# Patient Record
Sex: FEMALE | Race: BLACK OR AFRICAN AMERICAN | Employment: UNEMPLOYED | URBAN - METROPOLITAN AREA
[De-identification: names, ages, dates, MRNs, and addresses within clinical notes are randomized per-mention and may not be internally consistent; named-entity substitution may affect disease eponyms.]

---

## 2017-02-08 ENCOUNTER — APPOINTMENT (OUTPATIENT)
Dept: GENERAL RADIOLOGY | Age: 47
End: 2017-02-08
Attending: EMERGENCY MEDICINE
Payer: COMMERCIAL

## 2017-02-08 ENCOUNTER — HOSPITAL ENCOUNTER (EMERGENCY)
Age: 47
Discharge: HOME | End: 2017-02-08
Attending: EMERGENCY MEDICINE
Payer: COMMERCIAL

## 2017-02-08 VITALS
SYSTOLIC BLOOD PRESSURE: 182 MMHG | WEIGHT: 223 LBS | TEMPERATURE: 98.8 F | DIASTOLIC BLOOD PRESSURE: 92 MMHG | OXYGEN SATURATION: 96 % | RESPIRATION RATE: 16 BRPM | HEIGHT: 69 IN | BODY MASS INDEX: 33.03 KG/M2 | HEART RATE: 84 BPM
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
## 2017-02-08 DIAGNOSIS — T14.8XXA MUSCLE STRAIN: ICD-10-CM

## 2017-02-08 PROCEDURE — 73030 X-RAY EXAM OF SHOULDER: CPT

## 2017-02-08 PROCEDURE — 72040 X-RAY EXAM NECK SPINE 2-3 VW: CPT

## 2017-02-08 PROCEDURE — 99283 EMERGENCY DEPT VISIT LOW MDM: CPT | Performed by: PHYSICIAN ASSISTANT

## 2017-02-08 RX ORDER — IBUPROFEN 800 MG/1
800 TABLET ORAL
Qty: 30 TAB | Refills: 0 | Status: SHIPPED | OUTPATIENT
Start: 2017-02-08 | End: 2017-02-18

## 2017-02-08 RX ORDER — METHOCARBAMOL 750 MG/1
750 TABLET, FILM COATED ORAL 3 TIMES DAILY
Qty: 21 TAB | Refills: 0 | Status: SHIPPED | OUTPATIENT
Start: 2017-02-08 | End: 2017-02-15

## 2017-02-08 NOTE — ED NOTES
I have reviewed discharge instructions with the patient. The patient verbalized understanding. Prescriptions x 2 given to pt. Patient denies any further needs, questions, or concerns at this time. No adverse reactions to any treatments, meds, or procedures noted. Pt signed hard copy d/c form.
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
## 2017-10-14 ENCOUNTER — HOSPITAL ENCOUNTER (EMERGENCY)
Age: 47
Discharge: HOME | End: 2017-10-14
Attending: EMERGENCY MEDICINE
Payer: MEDICARE

## 2017-10-14 VITALS
WEIGHT: 220 LBS | HEIGHT: 69 IN | TEMPERATURE: 97.8 F | SYSTOLIC BLOOD PRESSURE: 175 MMHG | HEART RATE: 92 BPM | RESPIRATION RATE: 16 BRPM | OXYGEN SATURATION: 99 % | BODY MASS INDEX: 32.58 KG/M2 | DIASTOLIC BLOOD PRESSURE: 92 MMHG

## 2017-10-14 DIAGNOSIS — T14.8XXA BITE WOUND: Primary | ICD-10-CM

## 2017-10-14 PROCEDURE — 74011250636 HC RX REV CODE- 250/636: Performed by: EMERGENCY MEDICINE

## 2017-10-14 PROCEDURE — 74011250637 HC RX REV CODE- 250/637: Performed by: EMERGENCY MEDICINE

## 2017-10-14 PROCEDURE — 90471 IMMUNIZATION ADMIN: CPT | Performed by: EMERGENCY MEDICINE

## 2017-10-14 PROCEDURE — 90715 TDAP VACCINE 7 YRS/> IM: CPT | Performed by: EMERGENCY MEDICINE

## 2017-10-14 PROCEDURE — 99283 EMERGENCY DEPT VISIT LOW MDM: CPT | Performed by: EMERGENCY MEDICINE

## 2017-10-14 RX ORDER — DOXYCYCLINE 100 MG/1
100 CAPSULE ORAL
Status: COMPLETED | OUTPATIENT
Start: 2017-10-14 | End: 2017-10-14

## 2017-10-14 RX ORDER — DOXYCYCLINE HYCLATE 100 MG
100 TABLET ORAL 2 TIMES DAILY
Qty: 14 TAB | Refills: 0 | Status: SHIPPED | OUTPATIENT
Start: 2017-10-14 | End: 2017-10-21

## 2017-10-14 RX ADMIN — DOXYCYCLINE HYCLATE 100 MG: 100 CAPSULE ORAL at 01:35

## 2017-10-14 RX ADMIN — TETANUS TOXOID, REDUCED DIPHTHERIA TOXOID AND ACELLULAR PERTUSSIS VACCINE, ADSORBED 0.5 ML: 5; 2.5; 8; 8; 2.5 SUSPENSION INTRAMUSCULAR at 01:36

## 2017-10-14 NOTE — DISCHARGE INSTRUCTIONS
Gentle soap and cleaning to bite wound,  Then neosporin and bandiad  Antibiotics for three days to prevent infection    Alternate 4 ibuprofen every 8 hours with 2 extra-strength tylenol every 6 hours  Return if worse
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
## 2018-01-13 ENCOUNTER — HOSPITAL ENCOUNTER (EMERGENCY)
Age: 48
Discharge: HOME | End: 2018-01-14
Attending: EMERGENCY MEDICINE
Payer: MEDICARE

## 2018-01-13 DIAGNOSIS — N39.0 URINARY TRACT INFECTION WITHOUT HEMATURIA, SITE UNSPECIFIED: ICD-10-CM

## 2018-01-13 DIAGNOSIS — I10 HYPERTENSION, UNSPECIFIED TYPE: Primary | ICD-10-CM

## 2018-01-13 LAB
ALBUMIN SERPL-MCNC: 3.9 G/DL (ref 3.5–5)
ALBUMIN/GLOB SERPL: 0.8 {RATIO} (ref 1.2–3.5)
ALP SERPL-CCNC: 100 U/L (ref 50–136)
ALT SERPL-CCNC: 31 U/L (ref 12–65)
ANION GAP SERPL CALC-SCNC: 10 MMOL/L (ref 7–16)
AST SERPL-CCNC: 28 U/L (ref 15–37)
BASOPHILS # BLD: 0.1 K/UL (ref 0–0.2)
BASOPHILS NFR BLD: 0 % (ref 0–2)
BILIRUB SERPL-MCNC: 0.2 MG/DL (ref 0.2–1.1)
BUN SERPL-MCNC: 5 MG/DL (ref 6–23)
CALCIUM SERPL-MCNC: 9.6 MG/DL (ref 8.3–10.4)
CHLORIDE SERPL-SCNC: 106 MMOL/L (ref 98–107)
CO2 SERPL-SCNC: 23 MMOL/L (ref 21–32)
CREAT SERPL-MCNC: 0.85 MG/DL (ref 0.6–1)
DIFFERENTIAL METHOD BLD: ABNORMAL
EOSINOPHIL # BLD: 0.2 K/UL (ref 0–0.8)
EOSINOPHIL NFR BLD: 2 % (ref 0.5–7.8)
ERYTHROCYTE [DISTWIDTH] IN BLOOD BY AUTOMATED COUNT: 16.2 % (ref 11.9–14.6)
GLOBULIN SER CALC-MCNC: 4.9 G/DL (ref 2.3–3.5)
GLUCOSE SERPL-MCNC: 107 MG/DL (ref 65–100)
HCT VFR BLD AUTO: 42.3 % (ref 35.8–46.3)
HGB BLD-MCNC: 13.9 G/DL (ref 11.7–15.4)
IMM GRANULOCYTES # BLD: 0.1 K/UL (ref 0–0.5)
IMM GRANULOCYTES NFR BLD AUTO: 1 % (ref 0–5)
LYMPHOCYTES # BLD: 6.9 K/UL (ref 0.5–4.6)
LYMPHOCYTES NFR BLD: 50 % (ref 13–44)
MCH RBC QN AUTO: 25.1 PG (ref 26.1–32.9)
MCHC RBC AUTO-ENTMCNC: 32.9 G/DL (ref 31.4–35)
MCV RBC AUTO: 76.4 FL (ref 79.6–97.8)
MONOCYTES # BLD: 0.8 K/UL (ref 0.1–1.3)
MONOCYTES NFR BLD: 6 % (ref 4–12)
NEUTS SEG # BLD: 5.5 K/UL (ref 1.7–8.2)
NEUTS SEG NFR BLD: 41 % (ref 43–78)
PLATELET # BLD AUTO: 441 K/UL (ref 150–450)
PMV BLD AUTO: 10.1 FL (ref 10.8–14.1)
POTASSIUM SERPL-SCNC: 3.5 MMOL/L (ref 3.5–5.1)
PROT SERPL-MCNC: 8.8 G/DL (ref 6.3–8.2)
RBC # BLD AUTO: 5.54 M/UL (ref 4.05–5.25)
SODIUM SERPL-SCNC: 139 MMOL/L (ref 136–145)
WBC # BLD AUTO: 13.6 K/UL (ref 4.3–11.1)

## 2018-01-13 PROCEDURE — 99285 EMERGENCY DEPT VISIT HI MDM: CPT | Performed by: EMERGENCY MEDICINE

## 2018-01-13 PROCEDURE — 80053 COMPREHEN METABOLIC PANEL: CPT | Performed by: EMERGENCY MEDICINE

## 2018-01-13 PROCEDURE — 85025 COMPLETE CBC W/AUTO DIFF WBC: CPT | Performed by: EMERGENCY MEDICINE

## 2018-01-13 RX ORDER — LISINOPRIL 10 MG/1
10 TABLET ORAL DAILY
COMMUNITY
End: 2018-01-14

## 2018-01-13 RX ORDER — HYDROXYZINE PAMOATE 25 MG/1
CAPSULE ORAL
Refills: 5 | COMMUNITY
Start: 2018-01-02

## 2018-01-13 RX ORDER — FLUVOXAMINE MALEATE 100 MG/1
TABLET, COATED ORAL
Refills: 5 | COMMUNITY
Start: 2018-01-05

## 2018-01-14 VITALS
OXYGEN SATURATION: 97 % | HEART RATE: 67 BPM | HEIGHT: 69 IN | RESPIRATION RATE: 23 BRPM | BODY MASS INDEX: 32.58 KG/M2 | SYSTOLIC BLOOD PRESSURE: 177 MMHG | WEIGHT: 220 LBS | TEMPERATURE: 98.1 F | DIASTOLIC BLOOD PRESSURE: 89 MMHG

## 2018-01-14 LAB
ATRIAL RATE: 63 BPM
BACTERIA URNS QL MICRO: NORMAL /HPF
CALCULATED P AXIS, ECG09: 73 DEGREES
CALCULATED R AXIS, ECG10: 57 DEGREES
CALCULATED T AXIS, ECG11: 36 DEGREES
CASTS URNS QL MICRO: 0 /LPF
CRYSTALS URNS QL MICRO: 0 /LPF
DIAGNOSIS, 93000: NORMAL
EPI CELLS #/AREA URNS HPF: 0 /HPF
HCG UR QL: NEGATIVE
MUCOUS THREADS URNS QL MICRO: 0 /LPF
OTHER OBSERVATIONS,UCOM: NORMAL
P-R INTERVAL, ECG05: 160 MS
Q-T INTERVAL, ECG07: 410 MS
QRS DURATION, ECG06: 90 MS
QTC CALCULATION (BEZET), ECG08: 419 MS
RBC #/AREA URNS HPF: 0 /HPF
TROPONIN I BLD-MCNC: 0 NG/ML (ref 0.02–0.05)
VENTRICULAR RATE, ECG03: 63 BPM
WBC URNS QL MICRO: NORMAL /HPF

## 2018-01-14 PROCEDURE — 81015 MICROSCOPIC EXAM OF URINE: CPT | Performed by: EMERGENCY MEDICINE

## 2018-01-14 PROCEDURE — 74011250637 HC RX REV CODE- 250/637: Performed by: EMERGENCY MEDICINE

## 2018-01-14 PROCEDURE — 93005 ELECTROCARDIOGRAM TRACING: CPT | Performed by: EMERGENCY MEDICINE

## 2018-01-14 PROCEDURE — 81025 URINE PREGNANCY TEST: CPT

## 2018-01-14 PROCEDURE — 84484 ASSAY OF TROPONIN QUANT: CPT

## 2018-01-14 RX ORDER — CLONIDINE HYDROCHLORIDE 0.1 MG/1
0.1 TABLET ORAL
Status: COMPLETED | OUTPATIENT
Start: 2018-01-14 | End: 2018-01-14

## 2018-01-14 RX ORDER — SULFAMETHOXAZOLE AND TRIMETHOPRIM 800; 160 MG/1; MG/1
1 TABLET ORAL 2 TIMES DAILY
Qty: 10 TAB | Refills: 0 | Status: SHIPPED | OUTPATIENT
Start: 2018-01-14 | End: 2018-01-19

## 2018-01-14 RX ORDER — LISINOPRIL AND HYDROCHLOROTHIAZIDE 12.5; 2 MG/1; MG/1
1 TABLET ORAL DAILY
Qty: 30 TAB | Refills: 0 | Status: SHIPPED | OUTPATIENT
Start: 2018-01-14

## 2018-01-14 RX ORDER — NAPROXEN 250 MG/1
500 TABLET ORAL ONCE
Status: COMPLETED | OUTPATIENT
Start: 2018-01-14 | End: 2018-01-14

## 2018-01-14 RX ORDER — METAXALONE 800 MG/1
800 TABLET ORAL ONCE
Status: COMPLETED | OUTPATIENT
Start: 2018-01-14 | End: 2018-01-14

## 2018-01-14 RX ADMIN — CLONIDINE HYDROCHLORIDE 0.1 MG: 0.1 TABLET ORAL at 00:12

## 2018-01-14 RX ADMIN — METAXALONE 800 MG: 800 TABLET ORAL at 00:58

## 2018-01-14 RX ADMIN — NAPROXEN 500 MG: 250 TABLET ORAL at 00:58

## 2018-01-14 RX ADMIN — CLONIDINE HYDROCHLORIDE 0.1 MG: 0.1 TABLET ORAL at 00:58

## 2018-01-14 NOTE — ED TRIAGE NOTES
Pt complains of hypertension and posterior headache.  States she has taken prescription lisinopril and ibuprofin at 2100

## 2018-01-14 NOTE — DISCHARGE INSTRUCTIONS
High Blood Pressure: Care Instructions  Your Care Instructions    If your blood pressure is usually above 140/90, you have high blood pressure, or hypertension. That means the top number is 140 or higher or the bottom number is 90 or higher, or both. Despite what a lot of people think, high blood pressure usually doesn't cause headaches or make you feel dizzy or lightheaded. It usually has no symptoms. But it does increase your risk for heart attack, stroke, and kidney or eye damage. The higher your blood pressure, the more your risk increases. Your doctor will give you a goal for your blood pressure. Your goal will be based on your health and your age. An example of a goal is to keep your blood pressure below 140/90. Lifestyle changes, such as eating healthy and being active, are always important to help lower blood pressure. You might also take medicine to reach your blood pressure goal.  Follow-up care is a key part of your treatment and safety. Be sure to make and go to all appointments, and call your doctor if you are having problems. It's also a good idea to know your test results and keep a list of the medicines you take. How can you care for yourself at home? Medical treatment  · If you stop taking your medicine, your blood pressure will go back up. You may take one or more types of medicine to lower your blood pressure. Be safe with medicines. Take your medicine exactly as prescribed. Call your doctor if you think you are having a problem with your medicine. · Talk to your doctor before you start taking aspirin every day. Aspirin can help certain people lower their risk of a heart attack or stroke. But taking aspirin isn't right for everyone, because it can cause serious bleeding. · See your doctor regularly. You may need to see the doctor more often at first or until your blood pressure comes down.   · If you are taking blood pressure medicine, talk to your doctor before you take decongestants or anti-inflammatory medicine, such as ibuprofen. Some of these medicines can raise blood pressure. · Learn how to check your blood pressure at home. Lifestyle changes  · Stay at a healthy weight. This is especially important if you put on weight around the waist. Losing even 10 pounds can help you lower your blood pressure. · If your doctor recommends it, get more exercise. Walking is a good choice. Bit by bit, increase the amount you walk every day. Try for at least 30 minutes on most days of the week. You also may want to swim, bike, or do other activities. · Avoid or limit alcohol. Talk to your doctor about whether you can drink any alcohol. · Try to limit how much sodium you eat to less than 2,300 milligrams (mg) a day. Your doctor may ask you to try to eat less than 1,500 mg a day. · Eat plenty of fruits (such as bananas and oranges), vegetables, legumes, whole grains, and low-fat dairy products. · Lower the amount of saturated fat in your diet. Saturated fat is found in animal products such as milk, cheese, and meat. Limiting these foods may help you lose weight and also lower your risk for heart disease. · Do not smoke. Smoking increases your risk for heart attack and stroke. If you need help quitting, talk to your doctor about stop-smoking programs and medicines. These can increase your chances of quitting for good. When should you call for help? Call 911 anytime you think you may need emergency care. This may mean having symptoms that suggest that your blood pressure is causing a serious heart or blood vessel problem. Your blood pressure may be over 180/110. ? For example, call 911 if:  ? · You have symptoms of a heart attack. These may include:  ¨ Chest pain or pressure, or a strange feeling in the chest.  ¨ Sweating. ¨ Shortness of breath. ¨ Nausea or vomiting.   ¨ Pain, pressure, or a strange feeling in the back, neck, jaw, or upper belly or in one or both shoulders or arms.  ¨ Lightheadedness or sudden weakness. ¨ A fast or irregular heartbeat. ? · You have symptoms of a stroke. These may include:  ¨ Sudden numbness, tingling, weakness, or loss of movement in your face, arm, or leg, especially on only one side of your body. ¨ Sudden vision changes. ¨ Sudden trouble speaking. ¨ Sudden confusion or trouble understanding simple statements. ¨ Sudden problems with walking or balance. ¨ A sudden, severe headache that is different from past headaches. ? · You have severe back or belly pain. ?Do not wait until your blood pressure comes down on its own. Get help right away. ?Call your doctor now or seek immediate care if:  ? · Your blood pressure is much higher than normal (such as 180/110 or higher), but you don't have symptoms. ? · You think high blood pressure is causing symptoms, such as:  ¨ Severe headache. ¨ Blurry vision. ? Watch closely for changes in your health, and be sure to contact your doctor if:  ? · Your blood pressure measures 140/90 or higher at least 2 times. That means the top number is 140 or higher or the bottom number is 90 or higher, or both. ? · You think you may be having side effects from your blood pressure medicine. ? · Your blood pressure is usually normal, but it goes above normal at least 2 times. Where can you learn more? Go to http://rocael-jane.info/. Enter T267 in the search box to learn more about \"High Blood Pressure: Care Instructions. \"  Current as of: September 21, 2016  Content Version: 11.4  © 2012-5082 [employer]. Care instructions adapted under license by [email or web address] (which disclaims liability or warranty for this information). If you have questions about a medical condition or this instruction, always ask your healthcare professional. [place] [address] any warranty or liability for your use of this information.          Urinary Tract Infection in Women: Care Instructions  Your Care Instructions    A urinary tract infection, or UTI, is a general term for an infection anywhere between the kidneys and the urethra (where urine comes out). Most UTIs are bladder infections. They often cause pain or burning when you urinate. UTIs are caused by bacteria and can be cured with antibiotics. Be sure to complete your treatment so that the infection goes away. Follow-up care is a key part of your treatment and safety. Be sure to make and go to all appointments, and call your doctor if you are having problems. It's also a good idea to know your test results and keep a list of the medicines you take. How can you care for yourself at home? · Take your antibiotics as directed. Do not stop taking them just because you feel better. You need to take the full course of antibiotics. · Drink extra water and other fluids for the next day or two. This may help wash out the bacteria that are causing the infection. (If you have kidney, heart, or liver disease and have to limit fluids, talk with your doctor before you increase your fluid intake.)  · Avoid drinks that are carbonated or have caffeine. They can irritate the bladder. · Urinate often. Try to empty your bladder each time. · To relieve pain, take a hot bath or lay a heating pad set on low over your lower belly or genital area. Never go to sleep with a heating pad in place. To prevent UTIs  · Drink plenty of water each day. This helps you urinate often, which clears bacteria from your system. (If you have kidney, heart, or liver disease and have to limit fluids, talk with your doctor before you increase your fluid intake.)  · Urinate when you need to. · Urinate right after you have sex. · Change sanitary pads often. · Avoid douches, bubble baths, feminine hygiene sprays, and other feminine hygiene products that have deodorants. · After going to the bathroom, wipe from front to back. When should you call for help?   Call your doctor now or seek immediate medical care if:  ? · Symptoms such as fever, chills, nausea, or vomiting get worse or appear for the first time. ? · You have new pain in your back just below your rib cage. This is called flank pain. ? · There is new blood or pus in your urine. ? · You have any problems with your antibiotic medicine. ? Watch closely for changes in your health, and be sure to contact your doctor if:  ? · You are not getting better after taking an antibiotic for 2 days. ? · Your symptoms go away but then come back. Where can you learn more? Go to http://rocael-jane.info/. Enter B793 in the search box to learn more about \"Urinary Tract Infection in Women: Care Instructions. \"  Current as of: May 12, 2017  Content Version: 11.4  © 5941-4807 [employer]. Care instructions adapted under license by [employer] (which disclaims liability or warranty for this information). If you have questions about a medical condition or this instruction, always ask your healthcare professional. [place] [address] any warranty or liability for your use of this information.

## 2018-01-14 NOTE — ED NOTES
I have reviewed discharge instructions with the patient. The patient verbalized understanding. Patient left ED via Discharge Method: ambulatory to Home with family. Opportunity for questions and clarification provided. Patient given 2 scripts. To continue your aftercare when you leave the hospital, you may receive an automated call from our care team to check in on how you are doing. This is a free service and part of our promise to provide the best care and service to meet your aftercare needs.  If you have questions, or wish to unsubscribe from this service please call [phone]. Thank you for Choosing our [facility].

## 2018-01-15 ENCOUNTER — HOSPITAL ENCOUNTER (EMERGENCY)
Age: 48
Discharge: HOME | End: 2018-01-16
Attending: EMERGENCY MEDICINE
Payer: MEDICARE

## 2018-01-15 VITALS
WEIGHT: 220 LBS | HEART RATE: 71 BPM | RESPIRATION RATE: 18 BRPM | SYSTOLIC BLOOD PRESSURE: 132 MMHG | OXYGEN SATURATION: 99 % | HEIGHT: 69 IN | TEMPERATURE: 97.8 F | BODY MASS INDEX: 32.58 KG/M2 | DIASTOLIC BLOOD PRESSURE: 86 MMHG

## 2018-01-15 DIAGNOSIS — I10 HYPERTENSION, UNSPECIFIED TYPE: Primary | ICD-10-CM

## 2018-01-15 LAB — TROPONIN I BLD-MCNC: 0 NG/ML (ref 0.02–0.05)

## 2018-01-15 PROCEDURE — 96372 THER/PROPH/DIAG INJ SC/IM: CPT | Performed by: EMERGENCY MEDICINE

## 2018-01-15 PROCEDURE — 99284 EMERGENCY DEPT VISIT MOD MDM: CPT | Performed by: EMERGENCY MEDICINE

## 2018-01-15 PROCEDURE — 74011250637 HC RX REV CODE- 250/637: Performed by: EMERGENCY MEDICINE

## 2018-01-15 PROCEDURE — 84484 ASSAY OF TROPONIN QUANT: CPT

## 2018-01-15 PROCEDURE — 93005 ELECTROCARDIOGRAM TRACING: CPT | Performed by: EMERGENCY MEDICINE

## 2018-01-15 PROCEDURE — 74011250636 HC RX REV CODE- 250/636: Performed by: EMERGENCY MEDICINE

## 2018-01-15 RX ORDER — CLONIDINE HYDROCHLORIDE 0.1 MG/1
0.2 TABLET ORAL
Status: COMPLETED | OUTPATIENT
Start: 2018-01-15 | End: 2018-01-15

## 2018-01-15 RX ORDER — KETOROLAC TROMETHAMINE 30 MG/ML
60 INJECTION, SOLUTION INTRAMUSCULAR; INTRAVENOUS
Status: COMPLETED | OUTPATIENT
Start: 2018-01-15 | End: 2018-01-15

## 2018-01-15 RX ADMIN — KETOROLAC TROMETHAMINE 60 MG: 30 INJECTION, SOLUTION INTRAMUSCULAR at 20:32

## 2018-01-15 RX ADMIN — CLONIDINE HYDROCHLORIDE 0.2 MG: 0.1 TABLET ORAL at 17:29

## 2018-01-15 NOTE — ED TRIAGE NOTES
Pt states she is having the same neck pain and elevated blood pressure that she was when she was seen here two days ago. Pt was given scripts but states they are not helping.

## 2018-01-16 LAB
ATRIAL RATE: 63 BPM
CALCULATED P AXIS, ECG09: 71 DEGREES
CALCULATED R AXIS, ECG10: 52 DEGREES
CALCULATED T AXIS, ECG11: 41 DEGREES
DIAGNOSIS, 93000: NORMAL
P-R INTERVAL, ECG05: 152 MS
Q-T INTERVAL, ECG07: 398 MS
QRS DURATION, ECG06: 94 MS
QTC CALCULATION (BEZET), ECG08: 407 MS
VENTRICULAR RATE, ECG03: 63 BPM

## 2018-01-16 NOTE — DISCHARGE INSTRUCTIONS
High Blood Pressure: Care Instructions  Your Care Instructions    If your blood pressure is usually above 140/90, you have high blood pressure, or hypertension. That means the top number is 140 or higher or the bottom number is 90 or higher, or both. Despite what a lot of people think, high blood pressure usually doesn't cause headaches or make you feel dizzy or lightheaded. It usually has no symptoms. But it does increase your risk for heart attack, stroke, and kidney or eye damage. The higher your blood pressure, the more your risk increases. Your doctor will give you a goal for your blood pressure. Your goal will be based on your health and your age. An example of a goal is to keep your blood pressure below 140/90. Lifestyle changes, such as eating healthy and being active, are always important to help lower blood pressure. You might also take medicine to reach your blood pressure goal.  Follow-up care is a key part of your treatment and safety. Be sure to make and go to all appointments, and call your doctor if you are having problems. It's also a good idea to know your test results and keep a list of the medicines you take. How can you care for yourself at home? Medical treatment  · If you stop taking your medicine, your blood pressure will go back up. You may take one or more types of medicine to lower your blood pressure. Be safe with medicines. Take your medicine exactly as prescribed. Call your doctor if you think you are having a problem with your medicine. · Talk to your doctor before you start taking aspirin every day. Aspirin can help certain people lower their risk of a heart attack or stroke. But taking aspirin isn't right for everyone, because it can cause serious bleeding. · See your doctor regularly. You may need to see the doctor more often at first or until your blood pressure comes down.   · If you are taking blood pressure medicine, talk to your doctor before you take decongestants or anti-inflammatory medicine, such as ibuprofen. Some of these medicines can raise blood pressure. · Learn how to check your blood pressure at home. Lifestyle changes  · Stay at a healthy weight. This is especially important if you put on weight around the waist. Losing even 10 pounds can help you lower your blood pressure. · If your doctor recommends it, get more exercise. Walking is a good choice. Bit by bit, increase the amount you walk every day. Try for at least 30 minutes on most days of the week. You also may want to swim, bike, or do other activities. · Avoid or limit alcohol. Talk to your doctor about whether you can drink any alcohol. · Try to limit how much sodium you eat to less than 2,300 milligrams (mg) a day. Your doctor may ask you to try to eat less than 1,500 mg a day. · Eat plenty of fruits (such as bananas and oranges), vegetables, legumes, whole grains, and low-fat dairy products. · Lower the amount of saturated fat in your diet. Saturated fat is found in animal products such as milk, cheese, and meat. Limiting these foods may help you lose weight and also lower your risk for heart disease. · Do not smoke. Smoking increases your risk for heart attack and stroke. If you need help quitting, talk to your doctor about stop-smoking programs and medicines. These can increase your chances of quitting for good. When should you call for help? Call 911 anytime you think you may need emergency care. This may mean having symptoms that suggest that your blood pressure is causing a serious heart or blood vessel problem. Your blood pressure may be over 180/110. ? For example, call 911 if:  ? · You have symptoms of a heart attack. These may include:  ¨ Chest pain or pressure, or a strange feeling in the chest.  ¨ Sweating. ¨ Shortness of breath. ¨ Nausea or vomiting.   ¨ Pain, pressure, or a strange feeling in the back, neck, jaw, or upper belly or in one or both shoulders or arms.  ¨ Lightheadedness or sudden weakness. ¨ A fast or irregular heartbeat. ? · You have symptoms of a stroke. These may include:  ¨ Sudden numbness, tingling, weakness, or loss of movement in your face, arm, or leg, especially on only one side of your body. ¨ Sudden vision changes. ¨ Sudden trouble speaking. ¨ Sudden confusion or trouble understanding simple statements. ¨ Sudden problems with walking or balance. ¨ A sudden, severe headache that is different from past headaches. ? · You have severe back or belly pain. ?Do not wait until your blood pressure comes down on its own. Get help right away. ?Call your doctor now or seek immediate care if:  ? · Your blood pressure is much higher than normal (such as 180/110 or higher), but you don't have symptoms. ? · You think high blood pressure is causing symptoms, such as:  ¨ Severe headache. ¨ Blurry vision. ? Watch closely for changes in your health, and be sure to contact your doctor if:  ? · Your blood pressure measures 140/90 or higher at least 2 times. That means the top number is 140 or higher or the bottom number is 90 or higher, or both. ? · You think you may be having side effects from your blood pressure medicine. ? · Your blood pressure is usually normal, but it goes above normal at least 2 times. Where can you learn more? Go to http://rocael-jane.info/. Enter H215 in the search box to learn more about \"High Blood Pressure: Care Instructions. \"  Current as of: September 21, 2016  Content Version: 11.4  © 8437-7067 [employer]. Care instructions adapted under license by [employer] (which disclaims liability or warranty for this information). If you have questions about a medical condition or this instruction, always ask your healthcare professional. [place] [address] any warranty or liability for your use of this information.

## 2018-01-16 NOTE — ED PROVIDER NOTES
HPI Comments: Patient with a history of hypertension and takes lisinopril HCTZ. Recently had her dose increased on Saturday. Still having elevated blood pressures. Presents today with a mild headache  And mild chest pain prior to arrival.   Currently no chest pain but still with occipital headache. Dull in nature. No blurry vision. Patient is a 50 y.o. female presenting with hypertension. The history is provided by the patient. No  was used. Hypertension    This is a new problem. The current episode started more than 2 days ago. The problem has not changed since onset. Associated symptoms include chest pain and headaches. Pertinent negatives include no palpitations, no anxiety, no confusion, no malaise/fatigue, no blurred vision, no neck pain, no peripheral edema, no dizziness, no shortness of breath, no nausea and no vomiting. Risk factors include hypertension. Past Medical History:   Diagnosis Date    bipolar     bipolar    Hypertension        Past Surgical History:   Procedure Laterality Date    HX GYN      BTL         No family history on file. Social History     Social History    Marital status: SINGLE     Spouse name: N/A    Number of children: N/A    Years of education: N/A     Occupational History    Not on file. Social History Main Topics    Smoking status: Current Every Day Smoker     Packs/day: 0.75    Smokeless tobacco: Not on file    Alcohol use 1.5 oz/week     3 Cans of beer per week    Drug use: No    Sexual activity: Not on file     Other Topics Concern    Not on file     Social History Narrative         ALLERGIES: Pcn [penicillins]    Review of Systems   Constitutional: Negative for chills, fever and malaise/fatigue. HENT: Negative for rhinorrhea and sore throat. Eyes: Negative for blurred vision, pain and redness. Respiratory: Negative for chest tightness, shortness of breath and wheezing. Cardiovascular: Positive for chest pain. Negative for palpitations and leg swelling. Gastrointestinal: Negative for abdominal pain, diarrhea, nausea and vomiting. Genitourinary: Negative for dysuria and hematuria. Musculoskeletal: Negative for back pain, gait problem, neck pain and neck stiffness. Skin: Negative for color change and rash. Neurological: Positive for headaches. Negative for dizziness, weakness and numbness. Psychiatric/Behavioral: Negative for confusion. Vitals:    01/15/18 1710 01/15/18 1845 01/15/18 1848   BP: (!) 211/93  134/76   Pulse: 90     Resp: 18     Temp: 97.8 °F (36.6 °C)     SpO2: 99% 99%    Weight: 99.8 kg (220 lb)     Height: 5' 9\" (1.753 m)              Physical Exam   Constitutional: She is oriented to person, place, and time. She appears well-developed and well-nourished. HENT:   Head: Normocephalic and atraumatic. Eyes: EOM are normal. Pupils are equal, round, and reactive to light. Neck: Normal range of motion. Neck supple. Cardiovascular: Normal rate and regular rhythm. No murmur heard. Pulmonary/Chest: Effort normal and breath sounds normal. She has no wheezes. Abdominal: Soft. Bowel sounds are normal. There is no tenderness. Musculoskeletal: Normal range of motion. She exhibits no edema. Neurological: She is alert and oriented to person, place, and time. No cranial nerve deficit. She exhibits normal muscle tone. Coordination normal.   Skin: Skin is warm and dry. Nursing note and vitals reviewed. MDM  Number of Diagnoses or Management Options  Diagnosis management comments: Blood pressure improved. No EKG changes and negative troponin. Will discharge patient with follow-up with primary care doctor for blood pressure management.        Amount and/or Complexity of Data Reviewed  Clinical lab tests: ordered and reviewed  Tests in the medicine section of CPT®: ordered and reviewed    Patient Progress  Patient progress: stable    ED Course       Procedures      EKG: normal sinus rhythm, nonspecific ST and T waves changes. Rate 63. Results Include:    Recent Results (from the past 24 hour(s))   EKG, 12 LEAD, INITIAL    Collection Time: 01/15/18  7:57 PM   Result Value Ref Range    Ventricular Rate 63 BPM    Atrial Rate 63 BPM    P-R Interval 152 ms    QRS Duration 94 ms    Q-T Interval 398 ms    QTC Calculation (Bezet) 407 ms    Calculated P Axis 71 degrees    Calculated R Axis 52 degrees    Calculated T Axis 41 degrees    Diagnosis       !! AGE AND GENDER SPECIFIC ECG ANALYSIS !!   Normal sinus rhythm  Normal ECG  When compared with ECG of 14-JAN-2018 00:15,  No significant change was found     POC TROPONIN-I    Collection Time: 01/15/18  8:55 PM   Result Value Ref Range    Troponin-I (POC) 0 (L) 0.02 - 0.05 ng/ml

## 2018-09-05 ENCOUNTER — HOSPITAL ENCOUNTER (EMERGENCY)
Age: 48
Discharge: HOME | End: 2018-09-05
Attending: EMERGENCY MEDICINE
Payer: MEDICARE

## 2018-09-05 ENCOUNTER — APPOINTMENT (OUTPATIENT)
Dept: GENERAL RADIOLOGY | Age: 48
End: 2018-09-05
Attending: EMERGENCY MEDICINE
Payer: MEDICARE

## 2018-09-05 VITALS
DIASTOLIC BLOOD PRESSURE: 90 MMHG | HEART RATE: 64 BPM | RESPIRATION RATE: 18 BRPM | BODY MASS INDEX: 31.1 KG/M2 | TEMPERATURE: 98.1 F | HEIGHT: 69 IN | SYSTOLIC BLOOD PRESSURE: 168 MMHG | OXYGEN SATURATION: 100 % | WEIGHT: 210 LBS

## 2018-09-05 DIAGNOSIS — R06.2 WHEEZING: Primary | ICD-10-CM

## 2018-09-05 LAB
ALBUMIN SERPL-MCNC: 3.6 G/DL (ref 3.5–5)
ALBUMIN/GLOB SERPL: 0.8 {RATIO} (ref 1.2–3.5)
ALP SERPL-CCNC: 78 U/L (ref 50–136)
ALT SERPL-CCNC: 25 U/L (ref 12–65)
ANION GAP SERPL CALC-SCNC: 10 MMOL/L (ref 7–16)
AST SERPL-CCNC: 26 U/L (ref 15–37)
ATRIAL RATE: 65 BPM
BASOPHILS # BLD: 0.1 K/UL (ref 0–0.2)
BASOPHILS NFR BLD: 1 % (ref 0–2)
BILIRUB SERPL-MCNC: 0.6 MG/DL (ref 0.2–1.1)
BUN SERPL-MCNC: 4 MG/DL (ref 6–23)
CALCIUM SERPL-MCNC: 9.6 MG/DL (ref 8.3–10.4)
CALCULATED P AXIS, ECG09: 35 DEGREES
CALCULATED R AXIS, ECG10: 40 DEGREES
CALCULATED T AXIS, ECG11: 51 DEGREES
CHLORIDE SERPL-SCNC: 104 MMOL/L (ref 98–107)
CO2 SERPL-SCNC: 23 MMOL/L (ref 21–32)
CREAT SERPL-MCNC: 0.63 MG/DL (ref 0.6–1)
DIAGNOSIS, 93000: NORMAL
DIFFERENTIAL METHOD BLD: ABNORMAL
EOSINOPHIL # BLD: 0.2 K/UL (ref 0–0.8)
EOSINOPHIL NFR BLD: 2 % (ref 0.5–7.8)
ERYTHROCYTE [DISTWIDTH] IN BLOOD BY AUTOMATED COUNT: 14.6 %
GLOBULIN SER CALC-MCNC: 4.5 G/DL (ref 2.3–3.5)
GLUCOSE SERPL-MCNC: 101 MG/DL (ref 65–100)
HCT VFR BLD AUTO: 39.2 % (ref 35.8–46.3)
HGB BLD-MCNC: 12.6 G/DL (ref 11.7–15.4)
IMM GRANULOCYTES # BLD: 0 K/UL (ref 0–0.5)
IMM GRANULOCYTES NFR BLD AUTO: 0 % (ref 0–5)
LYMPHOCYTES # BLD: 4.3 K/UL (ref 0.5–4.6)
LYMPHOCYTES NFR BLD: 43 % (ref 13–44)
MCH RBC QN AUTO: 24.1 PG (ref 26.1–32.9)
MCHC RBC AUTO-ENTMCNC: 32.1 G/DL (ref 31.4–35)
MCV RBC AUTO: 75 FL (ref 79.6–97.8)
MONOCYTES # BLD: 0.6 K/UL (ref 0.1–1.3)
MONOCYTES NFR BLD: 6 % (ref 4–12)
NEUTS SEG # BLD: 4.9 K/UL (ref 1.7–8.2)
NEUTS SEG NFR BLD: 48 % (ref 43–78)
NRBC # BLD: 0 K/UL (ref 0–0.2)
P-R INTERVAL, ECG05: 132 MS
PLATELET # BLD AUTO: 422 K/UL (ref 150–450)
PMV BLD AUTO: 10 FL (ref 9.4–12.3)
POTASSIUM SERPL-SCNC: 3.5 MMOL/L (ref 3.5–5.1)
PROT SERPL-MCNC: 8.1 G/DL (ref 6.3–8.2)
Q-T INTERVAL, ECG07: 436 MS
QRS DURATION, ECG06: 84 MS
QTC CALCULATION (BEZET), ECG08: 453 MS
RBC # BLD AUTO: 5.23 M/UL (ref 4.05–5.2)
SODIUM SERPL-SCNC: 137 MMOL/L (ref 136–145)
TROPONIN I SERPL-MCNC: <0.02 NG/ML (ref 0.02–0.05)
VENTRICULAR RATE, ECG03: 65 BPM
WBC # BLD AUTO: 10.2 K/UL (ref 4.3–11.1)

## 2018-09-05 PROCEDURE — 99284 EMERGENCY DEPT VISIT MOD MDM: CPT | Performed by: EMERGENCY MEDICINE

## 2018-09-05 PROCEDURE — 80053 COMPREHEN METABOLIC PANEL: CPT

## 2018-09-05 PROCEDURE — 93005 ELECTROCARDIOGRAM TRACING: CPT | Performed by: EMERGENCY MEDICINE

## 2018-09-05 PROCEDURE — 85025 COMPLETE CBC W/AUTO DIFF WBC: CPT

## 2018-09-05 PROCEDURE — 81003 URINALYSIS AUTO W/O SCOPE: CPT | Performed by: EMERGENCY MEDICINE

## 2018-09-05 PROCEDURE — 74011000250 HC RX REV CODE- 250: Performed by: EMERGENCY MEDICINE

## 2018-09-05 PROCEDURE — 84484 ASSAY OF TROPONIN QUANT: CPT

## 2018-09-05 PROCEDURE — 71045 X-RAY EXAM CHEST 1 VIEW: CPT

## 2018-09-05 PROCEDURE — 94640 AIRWAY INHALATION TREATMENT: CPT

## 2018-09-05 RX ORDER — PREDNISONE 20 MG/1
40 TABLET ORAL DAILY
Qty: 8 TAB | Refills: 0 | Status: SHIPPED | OUTPATIENT
Start: 2018-09-05 | End: 2018-09-09

## 2018-09-05 RX ORDER — IPRATROPIUM BROMIDE AND ALBUTEROL SULFATE 2.5; .5 MG/3ML; MG/3ML
3 SOLUTION RESPIRATORY (INHALATION)
Status: COMPLETED | OUTPATIENT
Start: 2018-09-05 | End: 2018-09-05

## 2018-09-05 RX ORDER — ALBUTEROL SULFATE 90 UG/1
2 AEROSOL, METERED RESPIRATORY (INHALATION)
Qty: 1 INHALER | Refills: 4 | Status: SHIPPED | OUTPATIENT
Start: 2018-09-05

## 2018-09-05 RX ADMIN — IPRATROPIUM BROMIDE AND ALBUTEROL SULFATE 3 ML: .5; 3 SOLUTION RESPIRATORY (INHALATION) at 07:21

## 2018-09-05 NOTE — ED NOTES
I have reviewed discharge instructions with the patient. The patient and spouse verbalized understanding. Patient left ED via Discharge Method: ambulatory to Home with self. Opportunity for questions and clarification provided. Patient given 1 scripts. To continue your aftercare when you leave the hospital, you may receive an automated call from our care team to check in on how you are doing. This is a free service and part of our promise to provide the best care and service to meet your aftercare needs.  If you have questions, or wish to unsubscribe from this service please call [phone]. Thank you for Choosing our [facility] Emergency Department.

## 2018-09-05 NOTE — DISCHARGE INSTRUCTIONS
Wheezing or Bronchoconstriction: Care Instructions  Your Care Instructions  Wheezing is a whistling noise made during breathing. It occurs when the small airways, or bronchial tubes, that lead to your lungs swell or contract (spasm) and become narrow. This narrowing is called bronchoconstriction. When your airways constrict, it is hard for air to pass through and this makes it hard for you to breathe. Wheezing and bronchoconstriction can be caused by many problems, including:  · An infection such as the flu or a cold. · Allergies such as hay fever. · Diseases such as asthma or chronic obstructive pulmonary disease. · Smoking. Treatment for your wheezing depends on what is causing the problem. Your wheezing may get better without treatment. But you may need to pay attention to things that cause your wheezing and avoid them. Or you may need medicine to help treat the wheezing and to reduce the swelling or to relieve spasms in your lungs. Follow-up care is a key part of your treatment and safety. Be sure to make and go to all appointments, and call your doctor if you are having problems. It is also a good idea to know your test results and keep a list of the medicines you take. How can you care for yourself at home? · Take your medicine exactly as prescribed. Call your doctor if you think you are having a problem with your medicine. You will get more details on the specific medicine your doctor prescribes. · If your doctor prescribed antibiotics, take them as directed. Do not stop taking them just because you feel better. You need to take the full course of antibiotics. · Breathe moist air from a humidifier, hot shower, or sink filled with hot water. This may help ease your symptoms and make it easier for you to breathe. · If you have congestion in your nose and throat, drinking plenty of fluids, especially hot fluids, may help relieve your symptoms.  If you have kidney, heart, or liver disease and have to limit fluids, talk with your doctor before you increase the amount of fluids you drink. · If you have mucus in your airways, it may help to breathe deeply and cough. · Do not smoke or allow others to smoke around you. Smoking can make your wheezing worse. If you need help quitting, talk to your doctor about stop-smoking programs and medicines. These can increase your chances of quitting for good. · Avoid things that may cause your wheezing. These may include colds, smoke, air pollution, dust, pollen, pets, cockroaches, stress, and cold air. When should you call for help? Call 911 anytime you think you may need emergency care. For example, call if:    · You have severe trouble breathing.     · You passed out (lost consciousness).    Call your doctor now or seek immediate medical care if:    · You cough up yellow, dark brown, or bloody mucus (sputum).     · You have new or worse shortness of breath.     · Your wheezing is not getting better or it gets worse after you start taking your medicine.    Watch closely for changes in your health, and be sure to contact your doctor if:    · You do not get better as expected. Where can you learn more? Go to http://rocael-jane.info/. Enter 454 4772 in the search box to learn more about \"Wheezing or Bronchoconstriction: Care Instructions. \"  Current as of: December 6, 2017  Content Version: 11.7  © 9351-1495 [employer]. Care instructions adapted under license by "[employer] (which disclaims liability or warranty for this information). If you have questions about a medical condition or this instruction, always ask your healthcare professional. [place] [address] any warranty or liability for your use of this information.   Recent Results (from the past 8 hour(s))   EKG, 12 LEAD, INITIAL    Collection Time: 09/05/18  4:27 AM   Result Value Ref Range    Ventricular Rate 65 BPM    Atrial Rate 65 BPM    P-R Interval 132 ms    QRS Duration 84 ms    Q-T Interval 436 ms    QTC Calculation (Bezet) 453 ms    Calculated P Axis 35 degrees    Calculated R Axis 40 degrees    Calculated T Axis 51 degrees    Diagnosis       Normal sinus rhythm  Normal ECG  When compared with ECG of 15-MIKE-2018 19:57,  Nonspecific T wave abnormality no longer evident in Inferior leads  T wave amplitude has increased in Anterolateral leads  Confirmed by ST PIPPA DUVALL MD (), JODI SEQUEIRA ([id number]) on 9/5/2018 6:04:62 AM     METABOLIC PANEL, COMPREHENSIVE    Collection Time: 09/05/18  4:36 AM   Result Value Ref Range    Sodium 137 136 - 145 mmol/L    Potassium 3.5 3.5 - 5.1 mmol/L    Chloride 104 98 - 107 mmol/L    CO2 23 21 - 32 mmol/L    Anion gap 10 7 - 16 mmol/L    Glucose 101 (H) 65 - 100 mg/dL    BUN 4 (L) 6 - 23 MG/DL    Creatinine 0.63 0.6 - 1.0 MG/DL    GFR est AA >60 >60 ml/min/1.73m2    GFR est non-AA >60 >60 ml/min/1.73m2    Calcium 9.6 8.3 - 10.4 MG/DL    Bilirubin, total 0.6 0.2 - 1.1 MG/DL    ALT (SGPT) 25 12 - 65 U/L    AST (SGOT) 26 15 - 37 U/L    Alk. phosphatase 78 50 - 136 U/L    Protein, total 8.1 6.3 - 8.2 g/dL    Albumin 3.6 3.5 - 5.0 g/dL    Globulin 4.5 (H) 2.3 - 3.5 g/dL    A-G Ratio 0.8 (L) 1.2 - 3.5     CBC WITH AUTOMATED DIFF    Collection Time: 09/05/18  4:36 AM   Result Value Ref Range    WBC 10.2 4.3 - 11.1 K/uL    RBC 5.23 (H) 4.05 - 5.2 M/uL    HGB 12.6 11.7 - 15.4 g/dL    HCT 39.2 35.8 - 46.3 %    MCV 75.0 (L) 79.6 - 97.8 FL    MCH 24.1 (L) 26.1 - 32.9 PG    MCHC 32.1 31.4 - 35.0 g/dL    RDW 14.6 %    PLATELET 827 116 - 452 K/uL    MPV 10.0 9.4 - 12.3 FL    ABSOLUTE NRBC 0.00 0.0 - 0.2 K/uL    DF AUTOMATED      NEUTROPHILS 48 43 - 78 %    LYMPHOCYTES 43 13 - 44 %    MONOCYTES 6 4.0 - 12.0 %    EOSINOPHILS 2 0.5 - 7.8 %    BASOPHILS 1 0.0 - 2.0 %    IMMATURE GRANULOCYTES 0 0.0 - 5.0 %    ABS. NEUTROPHILS 4.9 1.7 - 8.2 K/UL    ABS. LYMPHOCYTES 4.3 0.5 - 4.6 K/UL    ABS. MONOCYTES 0.6 0.1 - 1.3 K/UL    ABS.  EOSINOPHILS 0.2 0.0 - 0.8 K/UL    ABS. BASOPHILS 0.1 0.0 - 0.2 K/UL    ABS. IMM. GRANS. 0.0 0.0 - 0.5 K/UL   TROPONIN I    Collection Time: 09/05/18  4:36 AM   Result Value Ref Range    Troponin-I, Qt. <0.02 (L) 0.02 - 0.05 NG/ML     Xr Chest Port    Result Date: 9/5/2018  EXAM:  XR CHEST PORT INDICATION:  shortness of breath COMPARISON:  7/30/2011 FINDINGS: A portable AP radiograph of the chest was obtained at 0433 hours. The patient is on a cardiac monitor. The lungs are clear. The cardiac and mediastinal contours and pulmonary vascularity are normal.  The bones and soft tissues are grossly within normal limits.      IMPRESSION: Normal chest.

## 2018-09-05 NOTE — ED PROVIDER NOTES
HPI: HPI Comments: 57-year-old female smoker presents to the emergency department with a few days of worsening shortness of breath. No cough. No fever. No alleviating. Worse with activity. She smokes a pack every day and a half Review of Systems: 
Review of Systems Constitutional: Negative for chills and fever. HENT: Negative. Respiratory: Positive for shortness of breath. Cardiovascular: Negative for chest pain. Musculoskeletal: Negative. Skin: Negative. Psychiatric/Behavioral: Negative. Past Medical History:    
Primary Care Doctor: Moncho Acevedo MD 
 
Past Medical History:  
Diagnosis Date  bipolar   
 bipolar  Hypertension Past Surgical History:  
Procedure Laterality Date  HX GYN    
 BTL Social History Social History  Marital status: SINGLE Spouse name: N/A  
 Number of children: N/A  
 Years of education: N/A Social History Main Topics  Smoking status: Current Every Day Smoker Packs/day: 0.75  Smokeless tobacco: Not on file  Alcohol use 1.5 oz/week 3 Cans of beer per week  Drug use: No  
 Sexual activity: Not on file Other Topics Concern  Not on file Social History Narrative Previous Medications FLUVOXAMINE (LUVOX) 100 MG TABLET    TAKE 3 TABLETS BY MOUTH EVERY MORNING  
 HYDROXYZINE PAMOATE (VISTARIL) 25 MG CAPSULE    TAKE ONE CAPSULE BY MOUTH 3 TIMES A DAY AS NEEDED  
 LISINOPRIL-HYDROCHLOROTHIAZIDE (PRINZIDE, ZESTORETIC) 20-12.5 MG PER TABLET    Take 1 Tab by mouth daily. OLANZAPINE (ZYPREXA PO)    Take 20 mg by mouth nightly. Allergies Allergen Reactions  Pcn [Penicillins] Rash Physical Exam:   
Vitals:  
 09/05/18 3838 09/05/18 3861 09/05/18 0530 09/05/18 7876 BP: (!) 193/96  177/90 Pulse: 61  64 Resp: 24  18 Temp: 97.9 °F (36.6 °C)  98.1 °F (36.7 °C) SpO2: 98% 97% 99% 99% Vital signs were reviewed.  Pulse oximetry interpretation: normal 
 Nursing documentation reviewed. Physical Exam  
Constitutional: She is oriented to person, place, and time. She appears well-developed and well-nourished. HENT:  
Head: Normocephalic. Cardiovascular: Normal rate and regular rhythm. Pulmonary/Chest: She has wheezes. Abdominal: Soft. Neurological: She is alert and oriented to person, place, and time. Skin: Skin is warm and dry. Psychiatric: She has a normal mood and affect. Her behavior is normal.  
Nursing note and vitals reviewed. ____________________________________________________________________ Medical Decision Making: MDM Number of Diagnoses or Management Options Diagnosis management comments: 43-year-old female presents to emergency room with shortness of breath started a few days ago Chest x-ray is negative. Troponin is normal.  She appears well Scattered wheezes Given an inhaler. Feels better afterwards We'll discharge home Amount and/or Complexity of Data Reviewed Clinical lab tests: ordered and reviewed 
 
 
______________________________________________________________________ 
ED Evaluation Labs:  
Recent Results (from the past 12 hour(s)) EKG, 12 LEAD, INITIAL Collection Time: 09/05/18  4:27 AM  
Result Value Ref Range Ventricular Rate 65 BPM  
 Atrial Rate 65 BPM  
 P-R Interval 132 ms QRS Duration 84 ms Q-T Interval 436 ms  
 QTC Calculation (Bezet) 453 ms Calculated P Axis 35 degrees Calculated R Axis 40 degrees Calculated T Axis 51 degrees Diagnosis Normal sinus rhythm Normal ECG When compared with ECG of 15-MIKE-2018 19:57, 
Nonspecific T wave abnormality no longer evident in Inferior leads T wave amplitude has increased in Anterolateral leads Confirmed by ST PIPPA DUVALL MD (), JODI SEQUEIRA ([id number]) on 9/5/2018 8:57:34 AM 
  
METABOLIC PANEL, COMPREHENSIVE Collection Time: 09/05/18  4:36 AM  
Result Value Ref Range  Sodium 137 136 - 145 mmol/L  
 Potassium 3.5 3.5 - 5.1 mmol/L Chloride 104 98 - 107 mmol/L  
 CO2 23 21 - 32 mmol/L Anion gap 10 7 - 16 mmol/L Glucose 101 (H) 65 - 100 mg/dL BUN 4 (L) 6 - 23 MG/DL Creatinine 0.63 0.6 - 1.0 MG/DL  
 GFR est AA >60 >60 ml/min/1.73m2 GFR est non-AA >60 >60 ml/min/1.73m2 Calcium 9.6 8.3 - 10.4 MG/DL Bilirubin, total 0.6 0.2 - 1.1 MG/DL  
 ALT (SGPT) 25 12 - 65 U/L  
 AST (SGOT) 26 15 - 37 U/L Alk. phosphatase 78 50 - 136 U/L Protein, total 8.1 6.3 - 8.2 g/dL Albumin 3.6 3.5 - 5.0 g/dL Globulin 4.5 (H) 2.3 - 3.5 g/dL A-G Ratio 0.8 (L) 1.2 - 3.5    
CBC WITH AUTOMATED DIFF Collection Time: 09/05/18  4:36 AM  
Result Value Ref Range WBC 10.2 4.3 - 11.1 K/uL  
 RBC 5.23 (H) 4.05 - 5.2 M/uL  
 HGB 12.6 11.7 - 15.4 g/dL HCT 39.2 35.8 - 46.3 % MCV 75.0 (L) 79.6 - 97.8 FL  
 MCH 24.1 (L) 26.1 - 32.9 PG  
 MCHC 32.1 31.4 - 35.0 g/dL  
 RDW 14.6 % PLATELET 469 951 - 580 K/uL MPV 10.0 9.4 - 12.3 FL ABSOLUTE NRBC 0.00 0.0 - 0.2 K/uL  
 DF AUTOMATED NEUTROPHILS 48 43 - 78 % LYMPHOCYTES 43 13 - 44 % MONOCYTES 6 4.0 - 12.0 % EOSINOPHILS 2 0.5 - 7.8 % BASOPHILS 1 0.0 - 2.0 % IMMATURE GRANULOCYTES 0 0.0 - 5.0 %  
 ABS. NEUTROPHILS 4.9 1.7 - 8.2 K/UL  
 ABS. LYMPHOCYTES 4.3 0.5 - 4.6 K/UL  
 ABS. MONOCYTES 0.6 0.1 - 1.3 K/UL  
 ABS. EOSINOPHILS 0.2 0.0 - 0.8 K/UL  
 ABS. BASOPHILS 0.1 0.0 - 0.2 K/UL  
 ABS. IMM. GRANS. 0.0 0.0 - 0.5 K/UL  
TROPONIN I Collection Time: 09/05/18  4:36 AM  
Result Value Ref Range Troponin-I, Qt. <0.02 (L) 0.02 - 0.05 NG/ML Labs were reviewed and interpreted by me. Radiology studies performed: XR CHEST PORT Final Result IMPRESSION: Normal chest.  
  
  
  
 Radiographs were visualized by me No current facility-administered medications for this encounter. Current Outpatient Prescriptions Medication Sig  
 lisinopril-hydroCHLOROthiazide (PRINZIDE, ZESTORETIC) 20-12.5 mg per tablet Take 1 Tab by mouth daily.  fluvoxaMINE (LUVOX) 100 mg tablet TAKE 3 TABLETS BY MOUTH EVERY MORNING  hydrOXYzine pamoate (VISTARIL) 25 mg capsule TAKE ONE CAPSULE BY MOUTH 3 TIMES A DAY AS NEEDED  
 OLANZAPINE (ZYPREXA PO) Take 20 mg by mouth nightly.  
 
 
================================================================== 
ASSESSMENT: 49-year-old female with shortness of breath. PLAN: ssteroids  And inhaler discharge 
_____________________________________________________________________ Condition:  improved Disposition:  home Diagnosis:  Shortness of breath likely early COPD Von Avery MD; 9/5/2018 @6:57 AM=========================================== 
 
ED Course

## 2018-09-05 NOTE — ED TRIAGE NOTES
Patient states shortness of breath that has been on going for past couple days. States worse when she lays down. States no history of breathing problems or CHF.

## 2020-02-23 ENCOUNTER — APPOINTMENT (OUTPATIENT)
Dept: CT IMAGING | Age: 50
End: 2020-02-23
Attending: EMERGENCY MEDICINE
Payer: MEDICARE

## 2020-02-23 ENCOUNTER — HOSPITAL ENCOUNTER (EMERGENCY)
Age: 50
Discharge: HOME | End: 2020-02-23
Attending: EMERGENCY MEDICINE
Payer: MEDICARE

## 2020-02-23 VITALS
OXYGEN SATURATION: 96 % | RESPIRATION RATE: 16 BRPM | TEMPERATURE: 97.8 F | BODY MASS INDEX: 31.25 KG/M2 | WEIGHT: 211 LBS | DIASTOLIC BLOOD PRESSURE: 87 MMHG | HEART RATE: 85 BPM | SYSTOLIC BLOOD PRESSURE: 146 MMHG | HEIGHT: 69 IN

## 2020-02-23 DIAGNOSIS — G44.209 TENSION HEADACHE: Primary | ICD-10-CM

## 2020-02-23 DIAGNOSIS — I10 HYPERTENSION, UNSPECIFIED TYPE: ICD-10-CM

## 2020-02-23 LAB
ATRIAL RATE: 78 BPM
CALCULATED P AXIS, ECG09: 64 DEGREES
CALCULATED R AXIS, ECG10: 40 DEGREES
CALCULATED T AXIS, ECG11: 43 DEGREES
DIAGNOSIS, 93000: NORMAL
P-R INTERVAL, ECG05: 156 MS
Q-T INTERVAL, ECG07: 376 MS
QRS DURATION, ECG06: 90 MS
QTC CALCULATION (BEZET), ECG08: 428 MS
VENTRICULAR RATE, ECG03: 78 BPM

## 2020-02-23 PROCEDURE — 70450 CT HEAD/BRAIN W/O DYE: CPT

## 2020-02-23 PROCEDURE — 74011250636 HC RX REV CODE- 250/636: Performed by: EMERGENCY MEDICINE

## 2020-02-23 PROCEDURE — 74011250637 HC RX REV CODE- 250/637: Performed by: EMERGENCY MEDICINE

## 2020-02-23 PROCEDURE — 93005 ELECTROCARDIOGRAM TRACING: CPT | Performed by: EMERGENCY MEDICINE

## 2020-02-23 PROCEDURE — 99284 EMERGENCY DEPT VISIT MOD MDM: CPT

## 2020-02-23 PROCEDURE — 96374 THER/PROPH/DIAG INJ IV PUSH: CPT

## 2020-02-23 RX ORDER — CLONIDINE HYDROCHLORIDE 0.1 MG/1
0.1 TABLET ORAL
Qty: 28 TAB | Refills: 0 | Status: SHIPPED | OUTPATIENT
Start: 2020-02-23 | End: 2020-03-08

## 2020-02-23 RX ORDER — KETOROLAC TROMETHAMINE 30 MG/ML
15 INJECTION, SOLUTION INTRAMUSCULAR; INTRAVENOUS
Status: COMPLETED | OUTPATIENT
Start: 2020-02-23 | End: 2020-02-23

## 2020-02-23 RX ORDER — CLONIDINE HYDROCHLORIDE 0.1 MG/1
0.1 TABLET ORAL
Status: COMPLETED | OUTPATIENT
Start: 2020-02-23 | End: 2020-02-23

## 2020-02-23 RX ADMIN — CLONIDINE HYDROCHLORIDE 0.1 MG: 0.1 TABLET ORAL at 02:30

## 2020-02-23 RX ADMIN — KETOROLAC TROMETHAMINE 15 MG: 30 INJECTION, SOLUTION INTRAMUSCULAR; INTRAVENOUS at 02:30

## 2020-02-23 NOTE — ED NOTES
I have reviewed discharge instructions with the patient. The patient verbalized understanding. Patient left ED via Discharge Method: ambulatory to Home with friend    Opportunity for questions and clarification provided. Patient given 1 scripts. To continue your aftercare when you leave the hospital, you may receive an automated call from our care team to check in on how you are doing. This is a free service and part of our promise to provide the best care and service to meet your aftercare needs.  If you have questions, or wish to unsubscribe from this service please call [phone]. Thank you for Choosing our [facility] Emergency Department.

## 2020-02-23 NOTE — ED PROVIDER NOTES
Patient is a 17-year-old female presenting to the emergency department today complaining of a headache in the back of the neck and across the forehead in a bandlike distribution. The patient denied any visual changes. She said that she went to an urgent care about 630 on 2/22/2020 and was told that her blood pressure was 385 systolic and that she needed to go to the hospital.  The patient says that her blood pressure routinely runs in the high 100s and low 200s. The patient has had her blood pressure medication adjusted 1 year ago but has not had any recent changes. She is currently taking lisinopril 40 mg and hydrochlorothiazide 25 mg. The patient says that she is compliant with all her medications. She denies any focal neurologic deficits but she said she was concerned because she has a family history of strokes. Past Medical History:   Diagnosis Date    bipolar     bipolar    Hypertension        Past Surgical History:   Procedure Laterality Date    HX GYN      BTL         No family history on file. Social History     Socioeconomic History    Marital status: SINGLE     Spouse name: Not on file    Number of children: Not on file    Years of education: Not on file    Highest education level: Not on file   Occupational History    Not on file   Social Needs    Financial resource strain: Not on file    Food insecurity:     Worry: Not on file     Inability: Not on file    Transportation needs:     Medical: Not on file     Non-medical: Not on file   Tobacco Use    Smoking status: Current Every Day Smoker     Packs/day: 0.75   Substance and Sexual Activity    Alcohol use:  Yes     Alcohol/week: 2.5 standard drinks     Types: 3 Cans of beer per week    Drug use: No    Sexual activity: Not on file   Lifestyle    Physical activity:     Days per week: Not on file     Minutes per session: Not on file    Stress: Not on file   Relationships    Social connections:     Talks on phone: Not on file     Gets together: Not on file     Attends Sikh service: Not on file     Active member of club or organization: Not on file     Attends meetings of clubs or organizations: Not on file     Relationship status: Not on file    Intimate partner violence:     Fear of current or ex partner: Not on file     Emotionally abused: Not on file     Physically abused: Not on file     Forced sexual activity: Not on file   Other Topics Concern    Not on file   Social History Narrative    Not on file         ALLERGIES: Pcn [penicillins]    Review of Systems   Constitutional: Negative. HENT: Negative. Eyes: Negative. Respiratory: Negative. Cardiovascular: Negative. Neurological: Positive for headaches. Negative for syncope, speech difficulty, weakness, light-headedness and numbness. Vitals:    02/23/20 0137 02/23/20 0154 02/23/20 0200 02/23/20 0230   BP: (!) 169/100 150/87 165/89 (!) 163/93   Pulse: 88 82 85 82   Resp: 18  20    Temp: 97.8 °F (36.6 °C)      SpO2: 94% 94% 94% 95%   Weight: 95.7 kg (211 lb)      Height: 5' 9\" (1.753 m)               Physical Exam     GENERAL:The patient is obese, and well-hydrated. No acute distress  VITAL SIGNS: Heart rate, blood pressure, respiratory rate reviewed as recorded in  nurse's notes  EYES: Pupils reactive. Extraocular motion intact. No conjunctival redness or drainage. Positive red reflex no papilledema appreciated. NECK: Supple, no meningeal signs. Trachea midline. No masses or thyromegaly. Tender to palpation over the occipital condyles bilaterally with some hypertrophy of the cervical paraspinal muscles. Tenderness palpation over the spinous processes present. Negative Kernig and Brudzinski sign. LUNGS: Breath sounds clear and equal bilaterally no accessory muscle use  CHEST: No deformity  CARDIOVASCULAR: Regular rate and rhythm  ABDOMEN: Soft without tenderness. No palpable masses or organomegaly. No  peritoneal signs. No rigidity.   EXTREMITIES: No clubbing or cyanosis. No joint swelling. Normal muscle tone. No  restricted range of motion appreciated. NEUROLOGIC: Sensation is grossly intact. Cranial nerve exam reveals face is  symmetrical, tongue is midline speech is clear. Negative pronator drift and  strength is 5-5 equal bilaterally. SKIN: No rash or petechiae. Good skin turgor palpated. PSYCHIATRIC: Alert and oriented. Appropriate behavior and judgment. MDM  Number of Diagnoses or Management Options  Diagnosis management comments: TIA, CVA, intracranial hemorrhage, ischemic stroke, brain tumor, Bell's palsy,    tension headache, migraine headache,    meningitis, encephalitis,    seizure, pseudoseizures, status epilepticus, malingering    peripheral neuropathy, metabolic disorder, Guillian Bardolph syndrome, multiple sclerosis,    electrolyte abnormality           Amount and/or Complexity of Data Reviewed  Clinical lab tests: reviewed and ordered  Tests in the radiology section of CPT®: ordered and reviewed  Tests in the medicine section of CPT®: ordered and reviewed  Review and summarize past medical records: yes  Independent visualization of images, tracings, or specimens: yes      ED Course as of Feb 23 0313   Sun Feb 23, 2020 0311 IMPRESSION:     1. No CT evidence of acute intracranial abnormality. CT HEAD WO CONT [KH]   [id number] Patient is doing well. She continues to not have any focal neurologic deficits on repeat examination. I talked to the patient about control of her blood pressure and the need to keep her appointment with her family doctor that she has scheduled in March.     []      ED Course User Index  [] Iris Baker, DO       Procedures

## 2020-02-23 NOTE — ED TRIAGE NOTES
Reports high blood pressure, reports seen at urgent care yesterday and instructed to come to ED however did not. Reports frontal  head pain. States compliant with bp meds. +smoker.  Denies cough, chest pain, n/v/d. +etoh

## 2020-02-23 NOTE — DISCHARGE INSTRUCTIONS
Keep your appointment with the family doctor in March.   Take the Catapres every 12 hours as needed for blood pressure greater than 180 or symptomatic symptoms as we discussed

## 2021-06-08 ENCOUNTER — HOSPITAL ENCOUNTER (EMERGENCY)
Age: 51
Discharge: HOME | End: 2021-06-08
Attending: EMERGENCY MEDICINE
Payer: MEDICARE

## 2021-06-08 VITALS
HEIGHT: 69 IN | SYSTOLIC BLOOD PRESSURE: 156 MMHG | OXYGEN SATURATION: 96 % | WEIGHT: 229 LBS | HEART RATE: 68 BPM | TEMPERATURE: 97.7 F | BODY MASS INDEX: 33.92 KG/M2 | RESPIRATION RATE: 16 BRPM | DIASTOLIC BLOOD PRESSURE: 92 MMHG

## 2021-06-08 DIAGNOSIS — L30.8 HERPES ZOSTER DERMATITIS: ICD-10-CM

## 2021-06-08 DIAGNOSIS — B02.8 HERPES ZOSTER DERMATITIS: ICD-10-CM

## 2021-06-08 DIAGNOSIS — B02.9 HERPES ZOSTER WITHOUT COMPLICATION: Primary | ICD-10-CM

## 2021-06-08 PROCEDURE — 99283 EMERGENCY DEPT VISIT LOW MDM: CPT

## 2021-06-08 RX ORDER — NAPROXEN 500 MG/1
500 TABLET ORAL 2 TIMES DAILY WITH MEALS
Qty: 20 TABLET | Refills: 0 | Status: SHIPPED | OUTPATIENT
Start: 2021-06-08 | End: 2021-06-18

## 2021-06-08 RX ORDER — PREDNISONE 50 MG/1
50 TABLET ORAL DAILY
Qty: 3 TABLET | Refills: 0 | Status: SHIPPED | OUTPATIENT
Start: 2021-06-08 | End: 2021-06-11

## 2021-06-08 RX ORDER — ACYCLOVIR 800 MG/1
800 TABLET ORAL
Qty: 35 TABLET | Refills: 0 | Status: SHIPPED | OUTPATIENT
Start: 2021-06-08 | End: 2021-06-15

## 2021-06-08 NOTE — ED PROVIDER NOTES
79 Brandan Samuels is a 44 y.o. female seen on 6/8/2021 in the [facility] EMERGENCY DEPT in room ER17/17. Chief Complaint   Patient presents with    Shingles     HPI: 78-year-old -American female presented to the emergency department with complaints of painful rash. Patient states that it started Saturday (3 days ago on her back and wraps around to the left side of her stomach. Patient states that it burns and hurts. Patient has no other constitutional symptoms. She is taken no medications. She thinks that she may have had chickenpox as a child. She denies any fevers, chills, nausea, vomiting, diarrhea, changes in bowel or bladder habits or any other concerns. Historian: Patient    REVIEW OF SYSTEMS     Review of Systems   Constitutional: Negative. HENT: Negative. Respiratory: Negative. Cardiovascular: Negative. Gastrointestinal: Negative. Genitourinary: Negative. Musculoskeletal: Negative. Skin: Positive for rash. Psychiatric/Behavioral: Negative. All other systems reviewed and are negative. PAST MEDICAL HISTORY     Past Medical History:   Diagnosis Date    bipolar     bipolar    Hypertension      Past Surgical History:   Procedure Laterality Date    HX GYN      BTL     Social History     Socioeconomic History    Marital status: SINGLE     Spouse name: Not on file    Number of children: Not on file    Years of education: Not on file    Highest education level: Not on file   Tobacco Use    Smoking status: Current Every Day Smoker     Packs/day: 0.75   Substance and Sexual Activity    Alcohol use:  Yes     Alcohol/week: 2.5 standard drinks     Types: 3 Cans of beer per week    Drug use: No     Social Determinants of Health     Financial Resource Strain:     Difficulty of Paying Living Expenses:    Food Insecurity:     Worried About Running Out of Food in the Last Year:     [address] in the Last Year: Transportation Needs:     Lack of Transportation (Medical):  Lack of Transportation (Non-Medical):    Physical Activity:     Days of Exercise per Week:     Minutes of Exercise per Session:    Stress:     Feeling of Stress :    Social Connections:     Frequency of Communication with Friends and Family:     Frequency of Social Gatherings with Friends and Family:     Attends Shinto Services:     Active Member of Clubs or Organizations:     Attends Club or Organization Meetings:     Marital Status:      Prior to Admission Medications   Prescriptions Last Dose Informant Patient Reported? Taking? OLANZAPINE (ZYPREXA PO)   Yes No   Sig: Take 20 mg by mouth nightly. albuterol (VENTOLIN HFA) 90 mcg/actuation inhaler   No No   Sig: Take 2 Puffs by inhalation every four (4) hours as needed for Wheezing or Shortness of Breath. fluvoxaMINE (LUVOX) 100 mg tablet   Yes No   Sig: TAKE 3 TABLETS BY MOUTH EVERY MORNING   hydrOXYzine pamoate (VISTARIL) 25 mg capsule   Yes No   Sig: TAKE ONE CAPSULE BY MOUTH 3 TIMES A DAY AS NEEDED   lisinopril-hydroCHLOROthiazide (PRINZIDE, ZESTORETIC) 20-12.5 mg per tablet   No No   Sig: Take 1 Tab by mouth daily. Facility-Administered Medications: None     Allergies   Allergen Reactions    Pcn [Penicillins] Rash        PHYSICAL EXAM       Vitals:    06/08/21 1330   BP: (!) 169/92   Pulse: 79   Resp: 16   Temp: 97.9 °F (36.6 °C)   SpO2: 95%    Vital signs were reviewed. Physical Exam  Vitals and nursing note reviewed. Constitutional:       General: She is not in acute distress. Appearance: Normal appearance. She is not ill-appearing or toxic-appearing. HENT:      Head: Normocephalic and atraumatic. Mouth/Throat:      Mouth: Mucous membranes are moist.   Cardiovascular:      Rate and Rhythm: Normal rate. Pulmonary:      Effort: Pulmonary effort is normal.   Abdominal:      Palpations: Abdomen is soft. Tenderness: There is no abdominal tenderness. There is no guarding. Skin:     General: Skin is warm and dry. Findings: Rash (Erythematous and vesicular rash located along the left T7/8 dermatome.) present. Neurological:      General: No focal deficit present. Mental Status: She is alert and oriented to person, place, and time. Psychiatric:         Mood and Affect: Mood normal.         Behavior: Behavior normal.         Thought Content: Thought content normal.         Judgment: Judgment normal.          MEDICAL DECISION MAKING     ED Course:    No results found for this or any previous visit (from the past 8 hour(s)). No results found. MDM  Number of Diagnoses or Management Options  Diagnosis management comments: 73-year-old female with a erythematous vesicular rash on the right mid torso wrapping around to the midline on the left side. Rash consistent with zoster dermatitis. Patient will be discharged home with short course of steroids and acyclovir. She is given instructions to avoid being around pregnant and immunocompromised patients. Disposition: Discharged  Diagnosis:     ICD-10-CM ICD-9-CM   1. Herpes zoster without complication  Z05.0 061.5   2. Herpes zoster dermatitis  B02.8 053.9    L30.8      ____________________________________________________________________  A portion of this note was generated using voice recognition dictation software. While the note has been reviewed for accuracy, please note certain words and phrases may not be transcribed as intended and some grammatical and/or typographical errors may be present.

## 2021-06-08 NOTE — ED NOTES
I have reviewed discharge instructions with the patient. The patient verbalized understanding. Patient left ED via Discharge Method: ambulatory to Home with self. Opportunity for questions and clarification provided. Patient given 3 scripts. To continue your aftercare when you leave the hospital, you may receive an automated call from our care team to check in on how you are doing. This is a free service and part of our promise to provide the best care and service to meet your aftercare needs.  If you have questions, or wish to unsubscribe from this service please call [phone]. Thank you for Choosing our [facility] Emergency Department.

## 2021-06-08 NOTE — DISCHARGE INSTRUCTIONS
Avoid pregnant and immunocompromised individuals. Take Tylenol for additional pain control. Return emergency department any concerns.

## 2021-06-08 NOTE — ED TRIAGE NOTES
Pt ambulatory to triage. Pt reports rash to left ribs and abd since Saturday. Denies known hx of shingles. Pt states area burns.

## 2022-07-03 ENCOUNTER — HOSPITAL ENCOUNTER (EMERGENCY)
Dept: CT IMAGING | Age: 52
Discharge: HOME | End: 2022-07-06
Payer: MEDICARE

## 2022-07-03 ENCOUNTER — HOSPITAL ENCOUNTER (EMERGENCY)
Age: 52
Discharge: HOME | End: 2022-07-03
Attending: STUDENT IN AN ORGANIZED HEALTH CARE EDUCATION/TRAINING PROGRAM
Payer: MEDICARE

## 2022-07-03 ENCOUNTER — APPOINTMENT (OUTPATIENT)
Dept: GENERAL RADIOLOGY | Age: 52
End: 2022-07-03
Payer: MEDICARE

## 2022-07-03 VITALS
HEIGHT: 70 IN | OXYGEN SATURATION: 99 % | DIASTOLIC BLOOD PRESSURE: 85 MMHG | BODY MASS INDEX: 32.21 KG/M2 | HEART RATE: 53 BPM | WEIGHT: 225 LBS | SYSTOLIC BLOOD PRESSURE: 128 MMHG | RESPIRATION RATE: 14 BRPM | TEMPERATURE: 97.5 F

## 2022-07-03 DIAGNOSIS — M25.511 ACUTE PAIN OF RIGHT SHOULDER: Primary | ICD-10-CM

## 2022-07-03 LAB
ANION GAP SERPL CALC-SCNC: 6 MMOL/L (ref 7–16)
BASOPHILS # BLD: 0.1 K/UL (ref 0–0.2)
BASOPHILS NFR BLD: 1 % (ref 0–2)
BUN SERPL-MCNC: 7 MG/DL (ref 6–23)
CALCIUM SERPL-MCNC: 9.5 MG/DL (ref 8.3–10.4)
CHLORIDE SERPL-SCNC: 104 MMOL/L (ref 98–107)
CO2 SERPL-SCNC: 28 MMOL/L (ref 21–32)
CREAT SERPL-MCNC: 0.8 MG/DL (ref 0.6–1)
DIFFERENTIAL METHOD BLD: ABNORMAL
EKG ATRIAL RATE: 59 BPM
EKG DIAGNOSIS: NORMAL
EKG P AXIS: 14 DEGREES
EKG P-R INTERVAL: 153 MS
EKG Q-T INTERVAL: 447 MS
EKG QRS DURATION: 92 MS
EKG QTC CALCULATION (BAZETT): 443 MS
EKG R AXIS: 57 DEGREES
EKG T AXIS: 46 DEGREES
EKG VENTRICULAR RATE: 59 BPM
EOSINOPHIL # BLD: 0.3 K/UL (ref 0–0.8)
EOSINOPHIL NFR BLD: 3 % (ref 0.5–7.8)
ERYTHROCYTE [DISTWIDTH] IN BLOOD BY AUTOMATED COUNT: 14.8 % (ref 11.9–14.6)
GLUCOSE SERPL-MCNC: 122 MG/DL (ref 65–100)
HCT VFR BLD AUTO: 41.7 % (ref 35.8–46.3)
HGB BLD-MCNC: 13.1 G/DL (ref 11.7–15.4)
IMM GRANULOCYTES # BLD AUTO: 0.1 K/UL (ref 0–0.5)
IMM GRANULOCYTES NFR BLD AUTO: 1 % (ref 0–5)
INR PPP: 1
LYMPHOCYTES # BLD: 5.3 K/UL (ref 0.5–4.6)
LYMPHOCYTES NFR BLD: 49 % (ref 13–44)
MCH RBC QN AUTO: 22.8 PG (ref 26.1–32.9)
MCHC RBC AUTO-ENTMCNC: 31.4 G/DL (ref 31.4–35)
MCV RBC AUTO: 72.6 FL (ref 79.6–97.8)
MONOCYTES # BLD: 0.7 K/UL (ref 0.1–1.3)
MONOCYTES NFR BLD: 6 % (ref 4–12)
NEUTS SEG # BLD: 4.4 K/UL (ref 1.7–8.2)
NEUTS SEG NFR BLD: 40 % (ref 43–78)
NRBC # BLD: 0 K/UL (ref 0–0.2)
PLATELET # BLD AUTO: 425 K/UL (ref 150–450)
PLATELET COMMENT: ADEQUATE
PMV BLD AUTO: 10.5 FL (ref 9.4–12.3)
POTASSIUM SERPL-SCNC: 3.9 MMOL/L (ref 3.5–5.1)
PROTHROMBIN TIME: 13.2 SEC (ref 12.6–14.5)
RBC # BLD AUTO: 5.74 M/UL (ref 4.05–5.2)
RBC MORPH BLD: ABNORMAL
SODIUM SERPL-SCNC: 138 MMOL/L (ref 136–145)
TROPONIN I SERPL HS-MCNC: 4.8 PG/ML (ref 0–14)
WBC # BLD AUTO: 10.9 K/UL (ref 4.3–11.1)
WBC MORPH BLD: ABNORMAL

## 2022-07-03 PROCEDURE — 84484 ASSAY OF TROPONIN QUANT: CPT

## 2022-07-03 PROCEDURE — 71045 X-RAY EXAM CHEST 1 VIEW: CPT

## 2022-07-03 PROCEDURE — 85610 PROTHROMBIN TIME: CPT

## 2022-07-03 PROCEDURE — 70450 CT HEAD/BRAIN W/O DYE: CPT

## 2022-07-03 PROCEDURE — 99285 EMERGENCY DEPT VISIT HI MDM: CPT

## 2022-07-03 PROCEDURE — 85025 COMPLETE CBC W/AUTO DIFF WBC: CPT

## 2022-07-03 PROCEDURE — 96374 THER/PROPH/DIAG INJ IV PUSH: CPT

## 2022-07-03 PROCEDURE — 93005 ELECTROCARDIOGRAM TRACING: CPT | Performed by: STUDENT IN AN ORGANIZED HEALTH CARE EDUCATION/TRAINING PROGRAM

## 2022-07-03 PROCEDURE — 6360000002 HC RX W HCPCS: Performed by: STUDENT IN AN ORGANIZED HEALTH CARE EDUCATION/TRAINING PROGRAM

## 2022-07-03 PROCEDURE — 80143 DRUG ASSAY ACETAMINOPHEN: CPT

## 2022-07-03 PROCEDURE — 80048 BASIC METABOLIC PNL TOTAL CA: CPT

## 2022-07-03 RX ORDER — SODIUM CHLORIDE 0.9 % (FLUSH) 0.9 %
3 SYRINGE (ML) INJECTION EVERY 8 HOURS
Status: DISCONTINUED | OUTPATIENT
Start: 2022-07-03 | End: 2022-07-03 | Stop reason: HOSPADM

## 2022-07-03 RX ORDER — METHOCARBAMOL 500 MG/1
500 TABLET, FILM COATED ORAL 4 TIMES DAILY
Qty: 20 TABLET | Refills: 0 | Status: SHIPPED | OUTPATIENT
Start: 2022-07-03 | End: 2022-07-08

## 2022-07-03 RX ORDER — KETOROLAC TROMETHAMINE 15 MG/ML
15 INJECTION, SOLUTION INTRAMUSCULAR; INTRAVENOUS ONCE
Status: COMPLETED | OUTPATIENT
Start: 2022-07-03 | End: 2022-07-03

## 2022-07-03 RX ORDER — NAPROXEN 500 MG/1
500 TABLET ORAL 2 TIMES DAILY WITH MEALS
Qty: 10 TABLET | Refills: 0 | Status: SHIPPED | OUTPATIENT
Start: 2022-07-03

## 2022-07-03 RX ADMIN — KETOROLAC TROMETHAMINE 15 MG: 15 INJECTION, SOLUTION INTRAMUSCULAR; INTRAVENOUS at 07:39

## 2022-07-03 ASSESSMENT — ENCOUNTER SYMPTOMS
DIARRHEA: 0
SHORTNESS OF BREATH: 0
NAUSEA: 0
ABDOMINAL PAIN: 0
SORE THROAT: 0
COUGH: 0
PHOTOPHOBIA: 0
VOMITING: 0

## 2022-07-03 ASSESSMENT — PAIN SCALES - GENERAL
PAINLEVEL_OUTOF10: 10
PAINLEVEL_OUTOF10: 10

## 2022-07-03 ASSESSMENT — PAIN - FUNCTIONAL ASSESSMENT: PAIN_FUNCTIONAL_ASSESSMENT: 0-10

## 2022-07-03 ASSESSMENT — PAIN DESCRIPTION - ORIENTATION: ORIENTATION: RIGHT

## 2022-07-03 ASSESSMENT — PAIN DESCRIPTION - LOCATION: LOCATION: ARM

## 2022-07-03 NOTE — ED NOTES
Chief Complaint   Patient presents with    Hypertension    Knee Pain    Follow-up    Constipation       HPI  Tina Oliveira is a 48 y.o. female with multiple medical diagnoses as listed in the medical history and problem list that presents for follow-up for right knee pain that has resolved since she completed PT. She had to miss her last session but is ready to return to work. She has been having some constipation though she has been off of pain medications for one week. She has some bloating also. She has also some dysuria.    PAST MEDICAL HISTORY:  Past Medical History:   Diagnosis Date    Hypertension        PAST SURGICAL HISTORY:  Past Surgical History:   Procedure Laterality Date     SECTION      x 3    TUBAL LIGATION         SOCIAL HISTORY:  Social History     Social History    Marital status:      Spouse name: N/A    Number of children: N/A    Years of education: N/A     Occupational History     [employer]     Social History Main Topics    Smoking status: Former Smoker     Quit date:     Smokeless tobacco: Never Used    Alcohol use Yes      Comment: 4 drinks monthly    Drug use: No    Sexual activity: Yes     Partners: Male     Other Topics Concern    Not on file     Social History Narrative    No narrative on file       FAMILY HISTORY:  Family History   Problem Relation Age of Onset    Hypertension Mother     Arthritis Mother     Cancer Father     Diabetes Sister     Hypertension Sister     Diabetes Sister     Hypertension Sister     Hypertension Sister     Diabetes Sister        ALLERGIES AND MEDICATIONS: updated and reviewed.  Review of patient's allergies indicates:  No Known Allergies  Current Outpatient Prescriptions   Medication Sig Dispense Refill    amLODIPine (NORVASC) 5 MG tablet TAKE ONE TABLET BY MOUTH ONCE DAILY 30 tablet 5    fluticasone (FLONASE) 50 mcg/actuation nasal spray 2 sprays by Each Nare route 2 (two) times daily. 1 Bottle 5     I have reviewed discharge instructions with the patient. The patient verbalized understanding. Patient left ED via Discharge Method: ambulatory to Home with self    Opportunity for questions and clarification provided. Patient given 2 scripts. To continue your aftercare when you leave the hospital, you may receive an automated call from our care team to check in on how you are doing. This is a free service and part of our promise to provide the best care and service to meet your aftercare needs.  If you have questions, or wish to unsubscribe from this service please call [phone]. Thank you for Choosing our [facility] Emergency Department.           Lou Seaman RN  07/03/22 1047 "hydrocodone-acetaminophen 5-325mg (NORCO) 5-325 mg per tablet Take 1 tablet by mouth every 6 (six) hours as needed. 25 tablet 0    tiZANidine (ZANAFLEX) 4 MG tablet Take 1 tablet (4 mg total) by mouth every 8 (eight) hours as needed (Muscle Spasms). 20 tablet 2     No current facility-administered medications for this visit.        ROS  Review of Systems   Constitutional: Negative for chills, diaphoresis, fatigue, fever and unexpected weight change.   HENT: Negative for rhinorrhea, sinus pressure, sore throat and tinnitus.    Eyes: Negative for photophobia and visual disturbance.   Respiratory: Negative for cough, shortness of breath and wheezing.    Cardiovascular: Negative for chest pain and palpitations.   Gastrointestinal: Negative for abdominal pain, blood in stool, constipation, diarrhea, nausea and vomiting.   Genitourinary: Negative for dysuria, flank pain, frequency and vaginal discharge.   Musculoskeletal: Positive for arthralgias. Negative for joint swelling.   Skin: Negative for rash.   Neurological: Negative for speech difficulty, weakness, light-headedness and headaches.   Psychiatric/Behavioral: Negative for behavioral problems and dysphoric mood.       Physical Exam  Vitals:    01/08/18 1346   BP: 136/82   Pulse: 88   Resp: 18   Temp: 98.1 °F (36.7 °C)   TempSrc: Oral   SpO2: 99%   Weight: 98.4 kg (216 lb 14.9 oz)   Height: 5' 7" (1.702 m)    Body mass index is 33.98 kg/m².  Weight: 98.4 kg (216 lb 14.9 oz)   Height: 5' 7" (170.2 cm)     Physical Exam   Constitutional: She is oriented to person, place, and time. She appears well-developed and well-nourished.   Eyes: EOM are normal.   Musculoskeletal:   Left knee-exam WNL, full ROM with flexion, extension, abduction and adduction   Neurological: She is alert and oriented to person, place, and time.   Skin: Skin is warm and dry. No rash noted. No erythema.   Psychiatric: She has a normal mood and affect. Her behavior is normal.   Nursing note and " vitals reviewed.      Health Maintenance       Date Due Completion Date    Sign Pain Contract 05/15/1985 ---    Complete Opioid Risk Tool 05/15/1985 ---    Pap Smear with HPV Cotest 03/29/2015 3/29/2012    Colonoscopy 05/15/2017 ---    Mammogram 04/22/2018 4/22/2016    Lipid Panel 04/22/2021 4/22/2016    TETANUS VACCINE 04/07/2027 4/7/2017 (Declined)    Override on 4/7/2017: Declined            ASSESSMENT     1. Chronic pain of left knee    2. Decreased strength involving knee joint    3. Constipation, unspecified constipation type    4. Dysuria        PLAN:     Problem List Items Addressed This Visit        Orthopedic    Chronic pain of left knee - Primary  -may return to work, letter given clearing her without restrictions  -symptoms resolved      Decreased strength involving knee joint  -resolved      Other Visit Diagnoses     Constipation, unspecified constipation type      -recommend she begin taking daily stool softener with plenty of water    Dysuria      -rule out infection    Relevant Orders    Urine culture            Betsey Gallagher MD  01/08/2018 2:43 PM        Return in about 6 months (around 7/8/2018) for Follow up.

## 2022-07-03 NOTE — ED TRIAGE NOTES
Pt presents with right arm pain and numbness that started approx 9 am Friday morning. States the pain started in her neck and upper shoulder then radiated down her arm. Also states she has taken a significant amount of tylenol over the last 48 hours. Pt states she may have taken as many as 75 tablets of extra strength Tylenol in that time.

## 2022-07-03 NOTE — ED PROVIDER NOTES
[facility] Emergency Department Provider Note                   PCP:                AIRAM Mota               Age: 46 y.o. Sex: female       ICD-10-CM    1. Acute pain of right shoulder  M25.511        DISPOSITION Decision To Discharge 07/03/2022 07:36:43 AM       New Prescriptions    METHOCARBAMOL (ROBAXIN) 500 MG TABLET    Take 1 tablet by mouth 4 times daily for 5 days    NAPROXEN (NAPROSYN) 500 MG TABLET    Take 1 tablet by mouth 2 times daily (with meals)       Orders Placed This Encounter   Procedures    CT HEAD WO CONTRAST    XR CHEST PORTABLE    Basic Metabolic Panel    CBC with Auto Differential    Protime-INR    Troponin    Miscellaneous Sendout    Diet NPO    Cardiac Monitor - ED Only    Continuous Pulse Oximetry    Weigh patient    POCT Glucose    EKG 12 Lead    Saline lock IV        Ledell Bless, DO 9:23 AM      MDM  Number of Diagnoses or Management Options  Acute pain of right shoulder  Diagnosis management comments: 61-year-old female presents to the emergency department with right shoulder and right side of her neck discomfort. Denies any trauma. Began 2 days ago. Initially in triage patient reported numbness, to me as well as to the patient's nurse, denies any numbness or any neurologic complaints. To me only reports pain in the right shoulder and right side of her neck. Has full range of motion of the shoulder and neck without problems. Has normal neurologic function in all extremities. Normal neuro exam.  Favor muscle strain but will rule out ACS. CT of the head which was ordered in triage shows no significant findings. Metabolic panel with normal electrolytes and normal kidney function, normal INR, normal troponin. Patient reports no resolution of symptoms with taking Tylenol, she did drive herself here today. Will give IV Toradol. CBC is normal.  Chest x-ray is normal EKG without signs of ischemia.   Feel patient's symptoms are secondary to musculoskeletal origin, shoulder versus right trapezius muscle strain. She will alternate Tylenol and Motrin at home. Given Robaxin. Sedation precautions given. Given strict return precautions. She voiced understanding and agreement with this plan. EKG shows sinus rhythm, rate 56, , QRS 92, QTc 442, normal axis, no significant ST elevation or depression. Amount and/or Complexity of Data Reviewed  Clinical lab tests: ordered and reviewed  Tests in the radiology section of CPT®: ordered and reviewed  Independent visualization of images, tracings, or specimens: yes    Risk of Complications, Morbidity, and/or Mortality  Presenting problems: moderate  Diagnostic procedures: moderate  Management options: moderate         April Kay Minor is a 46 y.o. female who presents to the Emergency Department with chief complaint of    Chief Complaint   Patient presents with    Numbness      52-year-old female presents to the emergency department initially complaining of numbness. During my exam she denies any numbness. States she has pain in her right shoulder as well as the right clavicular area as well as right side of neck. She reports it began 2 days ago while driving. States she was driving with her right upper extremity, one-handed on the wheel of the vehicle when she began to have pain in the shoulder and right side of neck. Denies numbness or weakness to the right upper extremity. She reports worsening pain with range of motion of her right upper extremity as well as with left side bending of the neck. Denies midline cervical spine tenderness. Denies pain with left and right rotation of the neck. Patient denies any numbness or weakness in any extremity. Denies vision changes, facial numbness, facial droop or weakness. Review of Systems   Constitutional: Negative for chills and fever. HENT: Negative for sore throat. Eyes: Negative for photophobia.    Respiratory: Negative for cough and shortness of breath. Cardiovascular: Negative for chest pain. Gastrointestinal: Negative for abdominal pain, diarrhea, nausea and vomiting. Genitourinary: Negative for dysuria. Musculoskeletal: Positive for arthralgias. Negative for neck pain and neck stiffness. Skin: Negative for rash. Neurological: Negative for syncope and headaches. Psychiatric/Behavioral: Negative for confusion. All other systems reviewed and are negative. Past Medical History:   Diagnosis Date    Hypertension     Psychiatric disorder     bipolar        Past Surgical History:   Procedure Laterality Date    GYN      BTL        No family history on file. Social Connections:     Frequency of Communication with Friends and Family: Not on file    Frequency of Social Gatherings with Friends and Family: Not on file    Attends Christianity Services: Not on file    Active Member of Clubs or Organizations: Not on file    Attends Club or Organization Meetings: Not on file    Marital Status: Not on file        Allergies   Allergen Reactions    Penicillins Rash        Vitals signs and nursing note reviewed. Patient Vitals for the past 4 hrs:   Temp Pulse Resp BP SpO2   07/03/22 0726 -- 60 23 133/88 98 %   07/03/22 0532 97.5 °F (36.4 °C) 65 22 (!) 148/85 94 %          Physical Exam  Vitals and nursing note reviewed. Constitutional:       General: She is not in acute distress. Appearance: Normal appearance. HENT:      Head: Normocephalic. Nose: Nose normal.      Mouth/Throat:      Mouth: Mucous membranes are moist.   Eyes:      Extraocular Movements: Extraocular movements intact. Pupils: Pupils are equal, round, and reactive to light. Cardiovascular:      Rate and Rhythm: Normal rate and regular rhythm. Pulses: Normal pulses. Heart sounds: Normal heart sounds. Pulmonary:      Effort: Pulmonary effort is normal. No respiratory distress. Breath sounds: Normal breath sounds. Abdominal:      General: Abdomen is flat. Palpations: Abdomen is soft. Tenderness: There is no abdominal tenderness. Musculoskeletal:         General: Tenderness present. No swelling, deformity or signs of injury. Normal range of motion. Cervical back: Normal range of motion. No rigidity. Comments: Right upper extremity: Full range of motion of the entire right upper extremity including the shoulder without difficulty. Neurovascularly intact. Tenderness palpation of the right side the neck without any evidence of trauma, erythema or edema. Skin:     General: Skin is warm. Findings: No rash. Neurological:      General: No focal deficit present. Mental Status: She is alert and oriented to person, place, and time. Cranial Nerves: No cranial nerve deficit. Sensory: No sensory deficit. Motor: No weakness. Psychiatric:         Mood and Affect: Mood normal.          Procedures      Labs Reviewed   BASIC METABOLIC PANEL - Abnormal; Notable for the following components:       Result Value    Anion Gap 6 (*)     Glucose 122 (*)     All other components within normal limits   CBC WITH AUTO DIFFERENTIAL - Abnormal; Notable for the following components:    RBC 5.74 (*)     MCV 72.6 (*)     MCH 22.8 (*)     RDW 14.8 (*)     Seg Neutrophils 40 (*)     Lymphocytes 49 (*)     Absolute Lymph # 5.3 (*)     All other components within normal limits   PROTIME-INR   TROPONIN   MISCELLANEOUS SENDOUT   POCT GLUCOSE        XR CHEST PORTABLE   Final Result   No acute cardiopulmonary abnormality. CT HEAD WO CONTRAST   Final Result      1. No CT evidence of acute intracranial process. NIH Stroke Scale  Interval: Baseline  Level of Consciousness (1a): Alert  LOC Questions (1b):  Answers both correctly  LOC Commands (1c): Performs both tasks correctly  Best Gaze (2): Normal  Visual (3): No visual loss  Facial Palsy (4): Normal symmetrical movement  Motor Arm, Left (5a): No drift  Motor Arm, Right (5b): No drift  Motor Leg, Left (6a): No drift  Motor Leg, Right (6b): No drift  Limb Ataxia (7): Absent  Sensory (8): Normal  Best Language (9): No aphasia  Dysarthria (10): Normal  Extinction and Inattention (11): No abnormality  Total: 0                 Voice dictation software was used during the making of this note. This software is not perfect and grammatical and other typographical errors may be present. This note has not been completely proofread for errors.         Stephenie Arriaga DO  07/03/22 8937

## 2022-07-05 LAB
Lab: <1
Lab: NORMAL
REFERENCE LAB: NORMAL

## 2023-05-28 ENCOUNTER — HOSPITAL ENCOUNTER (EMERGENCY)
Age: 53
Discharge: HOME | End: 2023-05-28
Attending: EMERGENCY MEDICINE
Payer: MEDICARE

## 2023-05-28 VITALS
HEART RATE: 91 BPM | SYSTOLIC BLOOD PRESSURE: 162 MMHG | RESPIRATION RATE: 20 BRPM | HEIGHT: 70 IN | TEMPERATURE: 98.7 F | BODY MASS INDEX: 33.21 KG/M2 | DIASTOLIC BLOOD PRESSURE: 101 MMHG | OXYGEN SATURATION: 99 % | WEIGHT: 232 LBS

## 2023-05-28 DIAGNOSIS — B35.4 TINEA CORPORIS: Primary | ICD-10-CM

## 2023-05-28 PROCEDURE — 99283 EMERGENCY DEPT VISIT LOW MDM: CPT

## 2023-05-28 RX ORDER — FLUCONAZOLE 200 MG/1
TABLET ORAL
Qty: 4 TABLET | Refills: 0 | Status: SHIPPED | OUTPATIENT
Start: 2023-05-28

## 2023-05-28 RX ORDER — CLOTRIMAZOLE 1 %
CREAM (GRAM) TOPICAL
Qty: 28 G | Refills: 1 | Status: SHIPPED | OUTPATIENT
Start: 2023-05-28 | End: 2023-06-04

## 2023-05-28 ASSESSMENT — PAIN - FUNCTIONAL ASSESSMENT: PAIN_FUNCTIONAL_ASSESSMENT: NONE - DENIES PAIN

## 2023-05-28 ASSESSMENT — LIFESTYLE VARIABLES
HOW OFTEN DO YOU HAVE A DRINK CONTAINING ALCOHOL: NEVER
HOW MANY STANDARD DRINKS CONTAINING ALCOHOL DO YOU HAVE ON A TYPICAL DAY: PATIENT DOES NOT DRINK

## 2023-08-21 ENCOUNTER — HOSPITAL ENCOUNTER (OUTPATIENT)
Age: 53
Setting detail: OBSERVATION
Discharge: HOME | End: 2023-08-23
Attending: EMERGENCY MEDICINE | Admitting: HOSPITALIST
Payer: MEDICARE

## 2023-08-21 ENCOUNTER — APPOINTMENT (OUTPATIENT)
Dept: CT IMAGING | Age: 53
End: 2023-08-21
Payer: MEDICARE

## 2023-08-21 DIAGNOSIS — G43.809 OTHER MIGRAINE WITHOUT STATUS MIGRAINOSUS, NOT INTRACTABLE: ICD-10-CM

## 2023-08-21 DIAGNOSIS — R53.1 LEFT-SIDED WEAKNESS: Primary | ICD-10-CM

## 2023-08-21 PROBLEM — R29.90 STROKE-LIKE SYMPTOM: Status: ACTIVE | Noted: 2023-08-21

## 2023-08-21 PROBLEM — R29.90 STROKE-LIKE SYMPTOMS: Status: ACTIVE | Noted: 2023-08-21

## 2023-08-21 PROBLEM — G45.9 TIA (TRANSIENT ISCHEMIC ATTACK): Status: ACTIVE | Noted: 2023-08-21

## 2023-08-21 LAB
ALBUMIN SERPL-MCNC: 3.9 G/DL (ref 3.5–5)
ALBUMIN/GLOB SERPL: 0.9 (ref 0.4–1.6)
ALP SERPL-CCNC: 126 U/L (ref 50–136)
ALT SERPL-CCNC: 59 U/L (ref 12–65)
ANION GAP SERPL CALC-SCNC: 3 MMOL/L (ref 2–11)
AST SERPL-CCNC: 38 U/L (ref 15–37)
BASOPHILS # BLD: 0.1 K/UL (ref 0–0.2)
BASOPHILS NFR BLD: 1 % (ref 0–2)
BILIRUB SERPL-MCNC: 0.4 MG/DL (ref 0.2–1.1)
BUN BLD-MCNC: 7 MG/DL (ref 8–26)
BUN SERPL-MCNC: 8 MG/DL (ref 6–23)
CALCIUM SERPL-MCNC: 9.7 MG/DL (ref 8.3–10.4)
CHLORIDE BLD-SCNC: 105 MMOL/L (ref 98–107)
CHLORIDE SERPL-SCNC: 109 MMOL/L (ref 101–110)
CO2 BLD-SCNC: 29.1 MMOL/L (ref 21–32)
CO2 SERPL-SCNC: 30 MMOL/L (ref 21–32)
CREAT BLD-MCNC: 0.62 MG/DL (ref 0.8–1.5)
CREAT SERPL-MCNC: 0.8 MG/DL (ref 0.6–1)
DIFFERENTIAL METHOD BLD: ABNORMAL
EKG ATRIAL RATE: 58 BPM
EKG DIAGNOSIS: NORMAL
EKG P AXIS: 21 DEGREES
EKG P-R INTERVAL: 133 MS
EKG Q-T INTERVAL: 425 MS
EKG QRS DURATION: 97 MS
EKG QTC CALCULATION (BAZETT): 421 MS
EKG R AXIS: 39 DEGREES
EKG T AXIS: 39 DEGREES
EKG VENTRICULAR RATE: 59 BPM
EOSINOPHIL # BLD: 0.2 K/UL (ref 0–0.8)
EOSINOPHIL NFR BLD: 2 % (ref 0.5–7.8)
ERYTHROCYTE [DISTWIDTH] IN BLOOD BY AUTOMATED COUNT: 14.6 % (ref 11.9–14.6)
GLOBULIN SER CALC-MCNC: 4.5 G/DL (ref 2.8–4.5)
GLUCOSE BLD-MCNC: 110 MG/DL (ref 65–100)
GLUCOSE SERPL-MCNC: 132 MG/DL (ref 65–100)
HCT VFR BLD AUTO: 40.9 % (ref 35.8–46.3)
HGB BLD-MCNC: 12.8 G/DL (ref 11.7–15.4)
IMM GRANULOCYTES # BLD AUTO: 0.1 K/UL (ref 0–0.5)
IMM GRANULOCYTES NFR BLD AUTO: 1 % (ref 0–5)
INR PPP: 1
LYMPHOCYTES # BLD: 3.5 K/UL (ref 0.5–4.6)
LYMPHOCYTES NFR BLD: 35 % (ref 13–44)
MCH RBC QN AUTO: 23.4 PG (ref 26.1–32.9)
MCHC RBC AUTO-ENTMCNC: 31.3 G/DL (ref 31.4–35)
MCV RBC AUTO: 74.6 FL (ref 82–102)
MONOCYTES # BLD: 0.5 K/UL (ref 0.1–1.3)
MONOCYTES NFR BLD: 5 % (ref 4–12)
NEUTS SEG # BLD: 5.9 K/UL (ref 1.7–8.2)
NEUTS SEG NFR BLD: 57 % (ref 43–78)
NRBC # BLD: 0 K/UL (ref 0–0.2)
PLATELET # BLD AUTO: 358 K/UL (ref 150–450)
PMV BLD AUTO: 10.1 FL (ref 9.4–12.3)
POTASSIUM BLD-SCNC: 5.4 MMOL/L (ref 3.5–5.1)
POTASSIUM SERPL-SCNC: 3.4 MMOL/L (ref 3.5–5.1)
PROT SERPL-MCNC: 8.4 G/DL (ref 6.3–8.2)
PROTHROMBIN TIME: 13.3 SEC (ref 12.6–14.3)
RBC # BLD AUTO: 5.48 M/UL (ref 4.05–5.2)
SARS-COV-2 RDRP RESP QL NAA+PROBE: NOT DETECTED
SODIUM BLD-SCNC: 140 MMOL/L (ref 136–145)
SODIUM SERPL-SCNC: 142 MMOL/L (ref 133–143)
SOURCE: NORMAL
TROPONIN I SERPL HS-MCNC: 5.6 PG/ML (ref 0–14)
WBC # BLD AUTO: 10.2 K/UL (ref 4.3–11.1)

## 2023-08-21 PROCEDURE — 6360000002 HC RX W HCPCS: Performed by: HOSPITALIST

## 2023-08-21 PROCEDURE — 87635 SARS-COV-2 COVID-19 AMP PRB: CPT

## 2023-08-21 PROCEDURE — 99285 EMERGENCY DEPT VISIT HI MDM: CPT

## 2023-08-21 PROCEDURE — 96374 THER/PROPH/DIAG INJ IV PUSH: CPT

## 2023-08-21 PROCEDURE — 96372 THER/PROPH/DIAG INJ SC/IM: CPT

## 2023-08-21 PROCEDURE — 93005 ELECTROCARDIOGRAM TRACING: CPT

## 2023-08-21 PROCEDURE — 80053 COMPREHEN METABOLIC PANEL: CPT

## 2023-08-21 PROCEDURE — 70450 CT HEAD/BRAIN W/O DYE: CPT

## 2023-08-21 PROCEDURE — 96375 TX/PRO/DX INJ NEW DRUG ADDON: CPT

## 2023-08-21 PROCEDURE — 6370000000 HC RX 637 (ALT 250 FOR IP): Performed by: HOSPITALIST

## 2023-08-21 PROCEDURE — 93010 ELECTROCARDIOGRAM REPORT: CPT | Performed by: INTERNAL MEDICINE

## 2023-08-21 PROCEDURE — 6370000000 HC RX 637 (ALT 250 FOR IP): Performed by: NURSE PRACTITIONER

## 2023-08-21 PROCEDURE — G0378 HOSPITAL OBSERVATION PER HR: HCPCS

## 2023-08-21 PROCEDURE — 6360000004 HC RX CONTRAST MEDICATION

## 2023-08-21 PROCEDURE — 85025 COMPLETE CBC W/AUTO DIFF WBC: CPT

## 2023-08-21 PROCEDURE — 70498 CT ANGIOGRAPHY NECK: CPT

## 2023-08-21 PROCEDURE — 6360000002 HC RX W HCPCS

## 2023-08-21 PROCEDURE — 80047 BASIC METABLC PNL IONIZED CA: CPT

## 2023-08-21 PROCEDURE — 2580000003 HC RX 258: Performed by: HOSPITALIST

## 2023-08-21 PROCEDURE — 85610 PROTHROMBIN TIME: CPT

## 2023-08-21 PROCEDURE — 84484 ASSAY OF TROPONIN QUANT: CPT

## 2023-08-21 RX ORDER — KETOROLAC TROMETHAMINE 30 MG/ML
15 INJECTION, SOLUTION INTRAMUSCULAR; INTRAVENOUS EVERY 6 HOURS PRN
Status: DISCONTINUED | OUTPATIENT
Start: 2023-08-21 | End: 2023-08-23 | Stop reason: HOSPADM

## 2023-08-21 RX ORDER — LABETALOL HYDROCHLORIDE 5 MG/ML
10 INJECTION, SOLUTION INTRAVENOUS EVERY 10 MIN PRN
Status: DISCONTINUED | OUTPATIENT
Start: 2023-08-21 | End: 2023-08-23 | Stop reason: HOSPADM

## 2023-08-21 RX ORDER — ACETAMINOPHEN 325 MG/1
650 TABLET ORAL EVERY 4 HOURS PRN
Status: DISCONTINUED | OUTPATIENT
Start: 2023-08-21 | End: 2023-08-23 | Stop reason: HOSPADM

## 2023-08-21 RX ORDER — ACETAMINOPHEN 325 MG/1
650 TABLET ORAL EVERY 4 HOURS PRN
Status: DISCONTINUED | OUTPATIENT
Start: 2023-08-21 | End: 2023-08-22 | Stop reason: SDUPTHER

## 2023-08-21 RX ORDER — CYCLOBENZAPRINE HCL 5 MG
5 TABLET ORAL 3 TIMES DAILY PRN
Status: DISCONTINUED | OUTPATIENT
Start: 2023-08-21 | End: 2023-08-23 | Stop reason: HOSPADM

## 2023-08-21 RX ORDER — ENOXAPARIN SODIUM 100 MG/ML
30 INJECTION SUBCUTANEOUS 2 TIMES DAILY
Status: DISCONTINUED | OUTPATIENT
Start: 2023-08-21 | End: 2023-08-23 | Stop reason: HOSPADM

## 2023-08-21 RX ORDER — ROSUVASTATIN CALCIUM 20 MG/1
40 TABLET, COATED ORAL NIGHTLY
Status: DISCONTINUED | OUTPATIENT
Start: 2023-08-21 | End: 2023-08-23 | Stop reason: HOSPADM

## 2023-08-21 RX ORDER — ONDANSETRON 4 MG/1
4 TABLET, ORALLY DISINTEGRATING ORAL EVERY 8 HOURS PRN
Status: DISCONTINUED | OUTPATIENT
Start: 2023-08-21 | End: 2023-08-23 | Stop reason: HOSPADM

## 2023-08-21 RX ORDER — PROCHLORPERAZINE EDISYLATE 5 MG/ML
10 INJECTION INTRAMUSCULAR; INTRAVENOUS
Status: COMPLETED | OUTPATIENT
Start: 2023-08-21 | End: 2023-08-21

## 2023-08-21 RX ORDER — ACETAMINOPHEN 650 MG/1
650 SUPPOSITORY RECTAL EVERY 4 HOURS PRN
Status: DISCONTINUED | OUTPATIENT
Start: 2023-08-21 | End: 2023-08-23 | Stop reason: HOSPADM

## 2023-08-21 RX ORDER — DIPHENHYDRAMINE HYDROCHLORIDE 50 MG/ML
12.5 INJECTION INTRAMUSCULAR; INTRAVENOUS ONCE
Status: COMPLETED | OUTPATIENT
Start: 2023-08-21 | End: 2023-08-21

## 2023-08-21 RX ORDER — SODIUM CHLORIDE 9 MG/ML
INJECTION, SOLUTION INTRAVENOUS PRN
Status: DISCONTINUED | OUTPATIENT
Start: 2023-08-21 | End: 2023-08-23 | Stop reason: HOSPADM

## 2023-08-21 RX ORDER — OLANZAPINE 5 MG/1
20 TABLET ORAL NIGHTLY
Status: DISCONTINUED | OUTPATIENT
Start: 2023-08-21 | End: 2023-08-23 | Stop reason: HOSPADM

## 2023-08-21 RX ORDER — POLYETHYLENE GLYCOL 3350 17 G/17G
17 POWDER, FOR SOLUTION ORAL DAILY PRN
Status: DISCONTINUED | OUTPATIENT
Start: 2023-08-21 | End: 2023-08-23 | Stop reason: HOSPADM

## 2023-08-21 RX ORDER — ASPIRIN 81 MG/1
81 TABLET, CHEWABLE ORAL DAILY
Status: DISCONTINUED | OUTPATIENT
Start: 2023-08-22 | End: 2023-08-23 | Stop reason: HOSPADM

## 2023-08-21 RX ORDER — ASPIRIN 300 MG/1
300 SUPPOSITORY RECTAL DAILY
Status: DISCONTINUED | OUTPATIENT
Start: 2023-08-22 | End: 2023-08-22

## 2023-08-21 RX ORDER — SODIUM CHLORIDE 0.9 % (FLUSH) 0.9 %
5-40 SYRINGE (ML) INJECTION EVERY 12 HOURS SCHEDULED
Status: DISCONTINUED | OUTPATIENT
Start: 2023-08-21 | End: 2023-08-23 | Stop reason: HOSPADM

## 2023-08-21 RX ORDER — OLANZAPINE 20 MG/1
20 TABLET ORAL NIGHTLY
COMMUNITY

## 2023-08-21 RX ORDER — ALBUTEROL SULFATE 90 UG/1
2 AEROSOL, METERED RESPIRATORY (INHALATION) EVERY 4 HOURS PRN
Status: DISCONTINUED | OUTPATIENT
Start: 2023-08-21 | End: 2023-08-23 | Stop reason: HOSPADM

## 2023-08-21 RX ORDER — ONDANSETRON 2 MG/ML
4 INJECTION INTRAMUSCULAR; INTRAVENOUS EVERY 6 HOURS PRN
Status: DISCONTINUED | OUTPATIENT
Start: 2023-08-21 | End: 2023-08-23 | Stop reason: HOSPADM

## 2023-08-21 RX ORDER — SODIUM CHLORIDE 0.9 % (FLUSH) 0.9 %
5-40 SYRINGE (ML) INJECTION PRN
Status: DISCONTINUED | OUTPATIENT
Start: 2023-08-21 | End: 2023-08-23 | Stop reason: HOSPADM

## 2023-08-21 RX ADMIN — ROSUVASTATIN CALCIUM 40 MG: 20 TABLET, FILM COATED ORAL at 20:59

## 2023-08-21 RX ADMIN — ENOXAPARIN SODIUM 30 MG: 100 INJECTION SUBCUTANEOUS at 20:59

## 2023-08-21 RX ADMIN — ACETAMINOPHEN 650 MG: 325 TABLET ORAL at 23:29

## 2023-08-21 RX ADMIN — PROCHLORPERAZINE EDISYLATE 10 MG: 5 INJECTION INTRAMUSCULAR; INTRAVENOUS at 11:31

## 2023-08-21 RX ADMIN — DIPHENHYDRAMINE HYDROCHLORIDE 12.5 MG: 50 INJECTION, SOLUTION INTRAMUSCULAR; INTRAVENOUS at 11:31

## 2023-08-21 RX ADMIN — OLANZAPINE 20 MG: 5 TABLET, FILM COATED ORAL at 20:59

## 2023-08-21 RX ADMIN — IOPAMIDOL 50 ML: 755 INJECTION, SOLUTION INTRAVENOUS at 09:49

## 2023-08-21 RX ADMIN — SODIUM CHLORIDE, PRESERVATIVE FREE 5 ML: 5 INJECTION INTRAVENOUS at 20:59

## 2023-08-21 ASSESSMENT — PAIN DESCRIPTION - LOCATION
LOCATION: HEAD
LOCATION: HAND;SHOULDER

## 2023-08-21 ASSESSMENT — PAIN - FUNCTIONAL ASSESSMENT: PAIN_FUNCTIONAL_ASSESSMENT: 0-10

## 2023-08-21 ASSESSMENT — PAIN SCALES - GENERAL
PAINLEVEL_OUTOF10: 2
PAINLEVEL_OUTOF10: 0
PAINLEVEL_OUTOF10: 0
PAINLEVEL_OUTOF10: 9
PAINLEVEL_OUTOF10: 0

## 2023-08-21 ASSESSMENT — PAIN DESCRIPTION - ORIENTATION
ORIENTATION: LEFT
ORIENTATION: POSTERIOR

## 2023-08-21 ASSESSMENT — PAIN DESCRIPTION - DESCRIPTORS: DESCRIPTORS: ACHING

## 2023-08-21 ASSESSMENT — LIFESTYLE VARIABLES
HOW MANY STANDARD DRINKS CONTAINING ALCOHOL DO YOU HAVE ON A TYPICAL DAY: 1 OR 2
HOW OFTEN DO YOU HAVE A DRINK CONTAINING ALCOHOL: 2-3 TIMES A WEEK

## 2023-08-21 ASSESSMENT — PAIN DESCRIPTION - FREQUENCY: FREQUENCY: INTERMITTENT

## 2023-08-21 ASSESSMENT — PAIN DESCRIPTION - PAIN TYPE: TYPE: ACUTE PAIN

## 2023-08-21 ASSESSMENT — PAIN DESCRIPTION - ONSET: ONSET: GRADUAL

## 2023-08-21 NOTE — CARE COORDINATION
Chart review complete, CM met with pt and male visitor at bedside, pt found laying on stretcher alert and oriented x4, pt lives alone. No anticipated DC needs noted at this time. CM staff will remain available to assist as needed. Demographics, insurance and PCP confirmed. 08/21/23 1039   Service Assessment   Patient Orientation Alert and Oriented   Cognition Alert   History Provided By Patient   Primary Caregiver Self   Accompanied By/Relationship male visitor   [address] is: Legal Next of Kin   PCP Verified by CM Yes  Latanya John NP at [facility])   Last Visit to PCP Within last 3 months  (last seen 2mths ago and has next appointment next month)   Prior Functional Level Independent in ADLs/IADLs   Current Functional Level Independent in ADLs/IADLs   Can patient return to prior living arrangement Yes   Ability to make needs known: Good   Family able to assist with home care needs: Yes   Would you like for me to discuss the discharge plan with any other family members/significant others, and if so, who? Yes   Financial Resources Medicare;Medicaid   Community Resources None   CM/SW Referral Other (see comment)  (na)   Social/Functional History   Lives With Alone   Type of [address] [place] [address]  One level   Home Access Stairs to enter with rails   Entrance Stairs - Number of Steps 2   Bathroom Shower/Tub Tub/Shower unit   Bathroom Toilet Standard   Bathroom Accessibility Accessible   Home Equipment None   ADL Assistance Independent   Homemaking Assistance Independent   Homemaking Responsibilities Yes   Ambulation Assistance Independent   Transfer Assistance Independent   Active  Yes   Occupation On disability   Discharge Planning   Type of [address] Prior To Admission None   Potential Assistance Needed N/A   DME Ordered?  No   Potential Assistance Purchasing Medications No   Type of Home Care Services None   Patient expects to be discharged to: House   One/Two Story Residence One story   History of falls? 0

## 2023-08-21 NOTE — ED NOTES
Patient noted by security to leave in her car. Pt contacted via phone by PA, and stated she would come back, and that she left because \"bed was uncomfortable. \"  Patient has returned. Awaiting a new bed order at this time.      Alee Mesa RN  08/21/23 2016

## 2023-08-21 NOTE — ED NOTES
Pt report and care transferred to [facility], RN at this time.        Pratibha Adam, ROXANE  08/21/23 3151

## 2023-08-21 NOTE — H&P
Hospitalist History and Physical   Admit Date:  2023  9:17 AM   Name:  Erin Mras   Age:  45 y.o. Sex:  female  :  1970   MRN:  [id number]   [facility]    Presenting/Chief Complaint: Headache and Extremity Weakness     Reason(s) for Admission: Stroke-like symptoms [R29.90]     History of Present Illness:   Erin Gottlibe is a 45 y.o. female with medical history of HTN, COPD. Admitted with stroke like symptoms now resolved. CT Head CTA Neck negative for acute intracranial abnormality or LVO. Note: The Patient may have eloped. She is Not present at the time of my Face to Face evaluation, cardiac monitor leads removed. RN staff  are investigating. Assessment & Plan:   TIA/ Stroke Like Symptoms  --Presenting Symptoms of Left Sided Hemiparesis and Headache lasting 20 minutes then resolved entirely    EKG Sinus, Normal Glucose and NA levels, CT Head CTA Head and Neck showed no acute intracranial abnormalities or large vessel occlusions   --Acute TIA/CVA Admission order set including neuro checks , cardiac monitoring, PT/OT/ST Evaluation, PRN Blood pressure control, MRI Brain, A1C, Lipid screen, start ASA + Atorvastatin       HTN  Accelerated HTN   --IV Labetolol PRN, Start home antihypertensives       COPD  --Not on home oxygen, not in exacerbation. , chronic condition requiring monitoring while hospitalized. Resume Albuterol MDI prn    PT/OT evals and PPD needed/ordered? Yes  Diet: Diet NPO  VTE prophylaxis: Lovenox  Code status: Full Code    Hospital Problems:  Principal Problem:    Stroke-like symptoms  Resolved Problems:    * No resolved hospital problems.  *       Past History:     Past Medical History:   Diagnosis Date    Hypertension     Psychiatric disorder     bipolar       Past Surgical History:   Procedure Laterality Date    GYN      BTL        Social History     Tobacco Use    Smoking status: Every Day     Packs/day: 0.75     Types: Cigarettes    Smokeless tobacco:

## 2023-08-21 NOTE — ED NOTES
Patient noted to not be in room, and belongings no longer there. I notified security. Staff checked bathrooms as well as outside. Patient was aware that she was being admitted, and has been compliant with care. Admitting doctor aware, as well as ED PA, who is attempting to call patient at this time.      Russell Vela RN  08/21/23 4889

## 2023-08-21 NOTE — ACP (ADVANCE CARE PLANNING)
Advance Care Planning   Healthcare Decision Maker:    Primary Decision Maker: Kateryna Polo - Matheus - [phone]    Click here to complete Healthcare Decision Makers including selection of the Healthcare Decision Maker Relationship (ie \"Primary\"). Today we documented Decision Maker(s) consistent with Legal Next of Kin hierarchy.

## 2023-08-21 NOTE — ED PROVIDER NOTES
Conjunctiva/sclera: Conjunctivae normal.      Pupils: Pupils are equal, round, and reactive to light. Cardiovascular:      Rate and Rhythm: Normal rate and regular rhythm. Heart sounds: Normal heart sounds. No murmur heard. No friction rub. No gallop. Pulmonary:      Effort: Pulmonary effort is normal. No respiratory distress. Breath sounds: Normal breath sounds. No stridor. No wheezing, rhonchi or rales. Musculoskeletal:         General: Normal range of motion. Cervical back: Normal range of motion. No rigidity or tenderness. Right lower leg: No edema. Left lower leg: No edema. Lymphadenopathy:      Cervical: No cervical adenopathy. Skin:     General: Skin is warm. Capillary Refill: Capillary refill takes less than 2 seconds. Coloration: Skin is not jaundiced or pale. Neurological:      General: No focal deficit present. Mental Status: She is alert and oriented to person, place, and time. GCS: GCS eye subscore is 4. GCS verbal subscore is 5. GCS motor subscore is 6. Cranial Nerves: Cranial nerves 2-12 are intact. No cranial nerve deficit, dysarthria or facial asymmetry. Sensory: Sensation is intact. No sensory deficit. Motor: Motor function is intact. No weakness, tremor, atrophy, abnormal muscle tone, seizure activity or pronator drift. Coordination: Coordination is intact. Romberg sign negative.  Coordination normal. Finger-Nose-Finger Test and Heel to [facility] Test normal. Rapid alternating movements normal.      Comments: NIH 0         EKG 12 Lead    Date/Time: 8/21/2023 11:11 AM  Performed by: Sofie Spears PA-C  Authorized by: Sofie Spears PA-C     Rate:     ECG rate:  59  Rhythm:     Rhythm: sinus rhythm    ST segments:     ST segments:  Normal     Orders Placed This Encounter   Procedures    COVID-19, Rapid    CT HEAD WO CONTRAST    CTA HEAD NECK W WO CONTRAST    Troponin    CBC with Auto Differential    Comprehensive - 46.3 %    MCV 74.6 (L) 82 - 102 FL    MCH 23.4 (L) 26.1 - 32.9 PG    MCHC 31.3 (L) 31.4 - 35.0 g/dL    RDW 14.6 11.9 - 14.6 %    Platelets 785 718 - 906 K/uL    MPV 10.1 9.4 - 12.3 FL    nRBC 0.00 0.0 - 0.2 K/uL    Differential Type AUTOMATED      Neutrophils % 57 43 - 78 %    Lymphocytes % 35 13 - 44 %    Monocytes % 5 4.0 - 12.0 %    Eosinophils % 2 0.5 - 7.8 %    Basophils % 1 0.0 - 2.0 %    Immature Granulocytes 1 0.0 - 5.0 %    Neutrophils Absolute 5.9 1.7 - 8.2 K/UL    Lymphocytes Absolute 3.5 0.5 - 4.6 K/UL    Monocytes Absolute 0.5 0.1 - 1.3 K/UL    Eosinophils Absolute 0.2 0.0 - 0.8 K/UL    Basophils Absolute 0.1 0.0 - 0.2 K/UL    Absolute Immature Granulocyte 0.1 0.0 - 0.5 K/UL   Comprehensive Metabolic Panel   Result Value Ref Range    Sodium 142 133 - 143 mmol/L    Potassium 3.4 (L) 3.5 - 5.1 mmol/L    Chloride 109 101 - 110 mmol/L    CO2 30 21 - 32 mmol/L    Anion Gap 3 2 - 11 mmol/L    Glucose 132 (H) 65 - 100 mg/dL    BUN 8 6 - 23 MG/DL    Creatinine 0.80 0.6 - 1.0 MG/DL    Est, Glom Filt Rate >60 >60 ml/min/1.73m2    Calcium 9.7 8.3 - 10.4 MG/DL    Total Bilirubin 0.4 0.2 - 1.1 MG/DL    ALT 59 12 - 65 U/L    AST 38 (H) 15 - 37 U/L    Alk Phosphatase 126 50 - 136 U/L    Total Protein 8.4 (H) 6.3 - 8.2 g/dL    Albumin 3.9 3.5 - 5.0 g/dL    Globulin 4.5 2.8 - 4.5 g/dL    Albumin/Globulin Ratio 0.9 0.4 - 1.6     Protime-INR   Result Value Ref Range    Protime 13.3 12.6 - 14.3 sec    INR 1.0     POC CHEM 8   Result Value Ref Range    POC Sodium 140 136 - 145 mmol/L    POC Potassium 5.4 (H) 3.5 - 5.1 mmol/L    POC Chloride 105 98 - 107 mmol/L    POC TCO2 29.1 21 - 32 mmol/L    POC Glucose 110 (H) 65 - 100 mg/dL    POC BUN 7 (L) 8 - 26 mg/dL    POC Creatinine 0.62 (L) 0.8 - 1.5 mg/dL    eGFR, POC >60 >60 ml/min/1.73m2   EKG 12 Lead   Result Value Ref Range    Ventricular Rate 59 BPM    Atrial Rate 58 BPM    P-R Interval 133 ms    QRS Duration 97 ms    Q-T Interval 425 ms    QTc Calculation (Bazett) 421 ms

## 2023-08-21 NOTE — ED TRIAGE NOTES
Pt reports headache that began a few days ago and left shoulder pain and \"limp\" this morning for around 20 mins and felt short of breath. No current chest pain and shortness of breath. No current numbness tingling, facial droop, or L/R sided weakness in arms or legs.  strength equal.   Pt took BP medications today. Does not take blood thinners. Denies fever.

## 2023-08-22 ENCOUNTER — APPOINTMENT (OUTPATIENT)
Dept: MRI IMAGING | Age: 53
End: 2023-08-22
Payer: MEDICARE

## 2023-08-22 PROBLEM — L70.9 ACNE: Status: ACTIVE | Noted: 2019-02-19

## 2023-08-22 PROBLEM — F17.200 TOBACCO DEPENDENCE SYNDROME: Status: ACTIVE | Noted: 2017-01-20

## 2023-08-22 PROBLEM — I10 ESSENTIAL HYPERTENSION: Status: ACTIVE | Noted: 2017-01-20

## 2023-08-22 PROBLEM — E66.9 OBESITY: Status: ACTIVE | Noted: 2023-07-23

## 2023-08-22 PROBLEM — R73.03 PREDIABETES: Status: ACTIVE | Noted: 2019-08-08

## 2023-08-22 PROBLEM — G44.89 HEADACHE SYNDROME: Chronic | Status: ACTIVE | Noted: 2023-08-21

## 2023-08-22 LAB
CHOLEST SERPL-MCNC: 152 MG/DL
ERYTHROCYTE [DISTWIDTH] IN BLOOD BY AUTOMATED COUNT: 14.7 % (ref 11.9–14.6)
EST. AVERAGE GLUCOSE BLD GHB EST-MCNC: 134 MG/DL
HBA1C MFR BLD: 6.3 % (ref 4.8–5.6)
HCT VFR BLD AUTO: 40.9 % (ref 35.8–46.3)
HDLC SERPL-MCNC: 45 MG/DL (ref 40–60)
HDLC SERPL: 3.4
HGB BLD-MCNC: 12.6 G/DL (ref 11.7–15.4)
LDLC SERPL CALC-MCNC: 62.6 MG/DL
MCH RBC QN AUTO: 23.2 PG (ref 26.1–32.9)
MCHC RBC AUTO-ENTMCNC: 30.8 G/DL (ref 31.4–35)
MCV RBC AUTO: 75.5 FL (ref 82–102)
NRBC # BLD: 0 K/UL (ref 0–0.2)
PLATELET # BLD AUTO: 358 K/UL (ref 150–450)
PMV BLD AUTO: 10.2 FL (ref 9.4–12.3)
RBC # BLD AUTO: 5.42 M/UL (ref 4.05–5.2)
TRIGL SERPL-MCNC: 222 MG/DL (ref 35–150)
VLDLC SERPL CALC-MCNC: 44.4 MG/DL (ref 6–23)
WBC # BLD AUTO: 11 K/UL (ref 4.3–11.1)

## 2023-08-22 PROCEDURE — 85027 COMPLETE CBC AUTOMATED: CPT

## 2023-08-22 PROCEDURE — 97535 SELF CARE MNGMENT TRAINING: CPT

## 2023-08-22 PROCEDURE — 80061 LIPID PANEL: CPT

## 2023-08-22 PROCEDURE — 92610 EVALUATE SWALLOWING FUNCTION: CPT

## 2023-08-22 PROCEDURE — 6370000000 HC RX 637 (ALT 250 FOR IP): Performed by: NURSE PRACTITIONER

## 2023-08-22 PROCEDURE — G0378 HOSPITAL OBSERVATION PER HR: HCPCS

## 2023-08-22 PROCEDURE — 6370000000 HC RX 637 (ALT 250 FOR IP): Performed by: HOSPITALIST

## 2023-08-22 PROCEDURE — 2580000003 HC RX 258: Performed by: HOSPITALIST

## 2023-08-22 PROCEDURE — 70551 MRI BRAIN STEM W/O DYE: CPT

## 2023-08-22 PROCEDURE — 6370000000 HC RX 637 (ALT 250 FOR IP): Performed by: FAMILY MEDICINE

## 2023-08-22 PROCEDURE — 36415 COLL VENOUS BLD VENIPUNCTURE: CPT

## 2023-08-22 PROCEDURE — 97161 PT EVAL LOW COMPLEX 20 MIN: CPT

## 2023-08-22 PROCEDURE — 97165 OT EVAL LOW COMPLEX 30 MIN: CPT

## 2023-08-22 PROCEDURE — 97530 THERAPEUTIC ACTIVITIES: CPT

## 2023-08-22 PROCEDURE — 83036 HEMOGLOBIN GLYCOSYLATED A1C: CPT

## 2023-08-22 PROCEDURE — 96372 THER/PROPH/DIAG INJ SC/IM: CPT

## 2023-08-22 PROCEDURE — 6360000002 HC RX W HCPCS: Performed by: HOSPITALIST

## 2023-08-22 RX ORDER — VERAPAMIL HYDROCHLORIDE 80 MG/1
40 TABLET ORAL EVERY 8 HOURS SCHEDULED
Status: DISCONTINUED | OUTPATIENT
Start: 2023-08-22 | End: 2023-08-23 | Stop reason: HOSPADM

## 2023-08-22 RX ADMIN — ROSUVASTATIN CALCIUM 40 MG: 20 TABLET, FILM COATED ORAL at 21:32

## 2023-08-22 RX ADMIN — OLANZAPINE 20 MG: 5 TABLET, FILM COATED ORAL at 21:32

## 2023-08-22 RX ADMIN — ACETAMINOPHEN 650 MG: 325 TABLET ORAL at 21:31

## 2023-08-22 RX ADMIN — SODIUM CHLORIDE, PRESERVATIVE FREE 10 ML: 5 INJECTION INTRAVENOUS at 21:32

## 2023-08-22 RX ADMIN — VERAPAMIL HYDROCHLORIDE 40 MG: 80 TABLET ORAL at 15:36

## 2023-08-22 RX ADMIN — VERAPAMIL HYDROCHLORIDE 40 MG: 80 TABLET ORAL at 21:35

## 2023-08-22 RX ADMIN — ASPIRIN 81 MG 81 MG: 81 TABLET ORAL at 12:09

## 2023-08-22 RX ADMIN — ENOXAPARIN SODIUM 30 MG: 100 INJECTION SUBCUTANEOUS at 12:09

## 2023-08-22 RX ADMIN — ENOXAPARIN SODIUM 30 MG: 100 INJECTION SUBCUTANEOUS at 21:31

## 2023-08-22 ASSESSMENT — PAIN SCALES - GENERAL
PAINLEVEL_OUTOF10: 3
PAINLEVEL_OUTOF10: 0
PAINLEVEL_OUTOF10: 6

## 2023-08-22 ASSESSMENT — PAIN DESCRIPTION - ORIENTATION
ORIENTATION: ANTERIOR
ORIENTATION: LEFT

## 2023-08-22 ASSESSMENT — PAIN DESCRIPTION - LOCATION
LOCATION: SHOULDER
LOCATION: GENERALIZED

## 2023-08-22 ASSESSMENT — PAIN DESCRIPTION - FREQUENCY: FREQUENCY: INTERMITTENT

## 2023-08-22 ASSESSMENT — PAIN DESCRIPTION - DESCRIPTORS
DESCRIPTORS: ACHING
DESCRIPTORS: ACHING

## 2023-08-22 ASSESSMENT — PAIN DESCRIPTION - PAIN TYPE: TYPE: ACUTE PAIN

## 2023-08-22 ASSESSMENT — PAIN DESCRIPTION - ONSET: ONSET: GRADUAL

## 2023-08-22 ASSESSMENT — PAIN - FUNCTIONAL ASSESSMENT: PAIN_FUNCTIONAL_ASSESSMENT: ACTIVITIES ARE NOT PREVENTED

## 2023-08-22 NOTE — DISCHARGE INSTRUCTIONS
fluids, enough so that your urine is light yellow or clear like water. If you have kidney, heart, or liver disease and have to limit fluids, talk with your doctor before you increase the amount of fluids you drink. Set up a regular time for using the toilet. If you continue to have constipation, your doctor may suggest using a bulking agent, such as Metamucil, or a stool softener, laxative, or enema. Medicines  Take your medicines exactly as prescribed. Call your doctor if you think you are having a problem with your medicine. You may be taking several medicines. ACE (angiotensin-converting enzyme) inhibitors, angiotensin II receptor blockers (ARBs), beta-blockers, diuretics (water pills), and calcium channel blockers control your blood pressure. Statins help lower cholesterol. Your doctor may also prescribe medicines for depression, pain, sleep problems, anxiety, or agitation. If your doctor has given you medicine that prevents blood clots, such as warfarin (Coumadin), aspirin combined with extended-release dipyridamole (Aggrenox), clopidogrel (Plavix), or aspirin to prevent another stroke, you should:  Tell your dentist, pharmacist, and other health professionals that you take these medicines. Watch for unusual bruising or bleeding, such as blood in your urine, red or black stools, or bleeding from your nose or gums. Get regular blood tests to check your clotting time if you are taking Coumadin. Wear medical alert jewelry that says you take blood thinners. You can buy this at most drugstores. Do not take any over-the-counter medicines or herbal products without talking to your doctor first.  If you take birth control pills or hormone replacement therapy, talk to your doctor about whether they are right for you. For family members and caregivers  Make the home safe. Set up a room so that your loved one does not have to climb stairs. Be sure the bathroom is on the same floor.  Move throw rugs and furniture that could cause falls, and make sure that the lighting is good. Put grab bars and seats in tubs and showers. Find out what your loved one can do and what he or she needs help with. Try not to do things for your loved one that your loved one can do on his or her own. Help him or her learn and practice new skills. Visit and talk with your loved one often. Try doing activities together that you both enjoy, such as playing cards or board games. Keep in touch with your loved one's friends as much as you can, and encourage them to visit. Take care of yourself. Do not try to do everything yourself. Ask other family members to help. Eat well, get enough rest, and take time to do things that you enjoy. Keep up with your own doctor visits, and make sure to take your medicines regularly. Get out of the house as much as you can. Join a local support group. Find out if you qualify for home health care visits to help with rehab or for adult day care. When should you call for help? Call 911 anytime you think you may need emergency care. For example, call if:  You have signs of another stroke. These may include:  Sudden numbness, paralysis, or weakness in your face, arm, or leg, especially on only one side of your body. New problems with walking or balance. Sudden vision changes. Drooling or slurred speech. New problems speaking or understanding simple statements, or you feel confused. A sudden, severe headache that is different from past headaches. Call 911 even if these symptoms go away in a few minutes. You cough up blood. You vomit blood or what looks like coffee grounds. You pass maroon or very bloody stools. Call your doctor now or seek immediate medical care if:  You have new bruises or blood spots under your skin. You have a nosebleed. Your gums bleed when you brush your teeth. You have blood in your urine. Your stools are black and tarlike or have streaks of blood.   You have vaginal bleeding when

## 2023-08-22 NOTE — PROGRESS NOTES
OBSERVATION STATUS      SPEECH LANGUAGE PATHOLOGY: DYSPHAGIA  Initial Assessment and Discharge    NAME: Erin Mars  : 1970  MRN: [id number]    ADMISSION DATE: 2023  PRIMARY DIAGNOSIS: Stroke-like symptoms  TIA (transient ischemic attack) [G45.9]  Left-sided weakness [R53.1]  Stroke-like symptoms [R29.90]  Stroke-like symptom [R29.90]    ICD-10: Treatment Diagnosis: R13.11 Dysphagia, Oral Phase    RECOMMENDATIONS   Diet:  Diet Solids Recommendation: Regular  Liquid Consistency Recommendation: Thin    Medications: PO             Referrals:  consider follow up with primary physician regarding outpatient cognitive testing   Patient continues to require skilled intervention: No. Please re-consult if new concerns arise. ASSESSMENT    Dysphagia Diagnosis: Swallow function appears WFL  Recommend regular diet and thin liquids. Per chart, MRI negative for acute CVA. Patient reports memory changes x1 week (forgetting where she is driving to). Of note, she endorses increased stress/sadness over past week and became upset talking about the 7 year anniversary of her mother's passing on . further evaluation/management of cognitive changes may be appropriate post acute. Dr. Angy Gonzalez notified. GENERAL    History of Present Injury/Illness: Ms. Vivek Rouse  has a past medical history of Hypertension and Psychiatric disorder. . She also  has a past surgical history that includes gyn. Subjective: awake in chair, oriented. Communication Observation: Functional  Follows Directions: Simple    Prior Dysphagia History: none   Prior instrumental assessment: n/a    Current Diet : RegularPrior level of functioning: independent, lives alone.    Current Liquid Diet : Thin                      Pain:   Patient does not c/o pain                                          OBJECTIVE    Patient Positioning: Upright in chair   Oral Motor   Labial: No impairment  Oral Hygiene: Moist;Clean  Lingual: No

## 2023-08-22 NOTE — PROGRESS NOTES
ACUTE OCCUPATIONAL THERAPY GOALS:   (Developed with and agreed upon by patient and/or caregiver.)  1. Patient will complete lower body bathing and dressing with Mod I and adaptive equipment as   needed. 2. Patient will completed upper body bathing and dressing with Mod I and adaptive equipment as needed. 3. Patient will complete grooming standing at sink with Mod I and adaptive equipment as needed. 4. Patient will complete toileting with Mod I and adaptive equipment as needed. 5. Patient will tolerate 30 minutes of OT treatment with no rest breaks to increase activity tolerance for ADLs. 6. Patient will complete functional transfers with Mod I and adaptive equipment as needed. Timeframe: 7 visits       OCCUPATIONAL THERAPY Initial Assessment, Daily Note, and AM       OT Visit Days: 1  Acknowledge Orders  Time  OT Charge Capture  Rehab Caseload Tracker      April Roldan Vicente is a 45 y.o. female   PRIMARY DIAGNOSIS: Stroke-like symptoms  TIA (transient ischemic attack) [G45.9]  Left-sided weakness [R53.1]  Stroke-like symptoms [R29.90]  Stroke-like symptom [R29.90]       Reason for Referral: Generalized Muscle Weakness (M62.81)  Other abnormalities of gait and mobility (R26.89)  Observation: Payor: [facility] MEDICARE / Plan: [employer] DUAL COMPLETE / Product Type: *No Product type* /     ASSESSMENT:     REHAB RECOMMENDATIONS:   Recommendation to date pending progress:  Setting:  No further skilled occupational therapy after discharge from hospital    Equipment:    None     ASSESSMENT:  Ms. Shima Faye is a 44 y/o F presenting with CVA-like symptoms. MRI negative. Pt supine on entry on RA, A/O x 4. Applicable PMHx: HTN, COPD, active smoker. Pt denied light headedness, dizziness or SOB. Pt reports no fall(s) in past 6 months. PLOF (I), living alone with good family support. DME present in home; none.  Pt currently SBA-Sup-V with UB ADLs, SBA with LB ADLs, SBA for toileting and SBA for functional t/fs and [] [] [] [x] [] [] [] [] [] [] []    Tub/Shower [] [] [] [] [] [] [] [] [] [x] []     Toilet [] [] [] [x] [] [] [] [] [] [] []  No AD    [] [] [] [] [] [] [] [] [] [] []    I=Independent, Mod I=Modified Independent, S=Supervision/Setup, SBA=Standby Assistance, CGA=Contact Guard Assistance, Min=Minimal Assistance, Mod=Moderate Assistance, Max=Maximal Assistance, Total=Total Assistance, NT=Not Tested    ACTIVITIES OF DAILY LIVING: I Mod I S SBA CGA Min Mod Max Total NT Comments   BASIC ADLs:              Upper Body Bathing  [] [] [x] [] [] [] [] [] [] []     Lower Body Bathing [] [] [] [x] [] [] [] [] [] []     Toileting [] [] [] [x] [] [] [] [] [] [] Simulated toileting seated and standing at commode with no AD   Upper Body Dressing [] [] [] [x] [] [] [] [] [] []    Lower Body Dressing [] [] [] [x] [] [] [] [] [] [] Doff/don socks and brief seated and standing EOB    Feeding [] [x] [] [] [] [] [] [] [] []    Grooming [] [] [x] [x] [] [] [] [] [] [] Hand hygiene, oral hygiene, face washing standing at sink with no AD   Personal Device Care [] [] [] [] [] [] [] [] [] [x]    Functional Mobility [] [] [] [x] [] [] [] [] [] [] Household mobility for ADLs in room, bathroom and on unit with no AD   I=Independent, Mod I=Modified Independent, S=Supervision/Setup, SBA=Standby Assistance, CGA=Contact Guard Assistance, Min=Minimal Assistance, Mod=Moderate Assistance, Max=Maximal Assistance, Total=Total Assistance, NT=Not Tested    PLAN:   FREQUENCY/DURATION   OT Plan of Care: 2 times/week for duration of hospital stay or until stated goals are met, whichever comes first.    PROBLEM LIST:   (Skilled intervention is medically necessary to address:)  Decreased ADL/Functional Activities  Decreased Activity Tolerance  Decreased Balance  Decreased Coordination  Decreased Gait Ability  Decreased Safety Awareness  Decreased Strength  Decreased Transfer Abilities  Increased Pain   INTERVENTIONS PLANNED:  (Benefits and precautions of

## 2023-08-22 NOTE — SIGNIFICANT EVENT
Message from nursing, request to resume Zyprexa 20 mg nightly per home dose. Med rec reviewed, Zyprexa resumed.

## 2023-08-23 VITALS
OXYGEN SATURATION: 89 % | WEIGHT: 229 LBS | HEART RATE: 62 BPM | HEIGHT: 70 IN | BODY MASS INDEX: 32.78 KG/M2 | TEMPERATURE: 97.7 F | RESPIRATION RATE: 17 BRPM | SYSTOLIC BLOOD PRESSURE: 162 MMHG | DIASTOLIC BLOOD PRESSURE: 85 MMHG

## 2023-08-23 PROCEDURE — 6360000002 HC RX W HCPCS: Performed by: HOSPITALIST

## 2023-08-23 PROCEDURE — 96375 TX/PRO/DX INJ NEW DRUG ADDON: CPT

## 2023-08-23 PROCEDURE — 96376 TX/PRO/DX INJ SAME DRUG ADON: CPT

## 2023-08-23 PROCEDURE — G0378 HOSPITAL OBSERVATION PER HR: HCPCS

## 2023-08-23 PROCEDURE — 6370000000 HC RX 637 (ALT 250 FOR IP): Performed by: FAMILY MEDICINE

## 2023-08-23 PROCEDURE — 6370000000 HC RX 637 (ALT 250 FOR IP): Performed by: HOSPITALIST

## 2023-08-23 PROCEDURE — 96372 THER/PROPH/DIAG INJ SC/IM: CPT

## 2023-08-23 RX ORDER — DEXAMETHASONE SODIUM PHOSPHATE 10 MG/ML
10 INJECTION INTRAMUSCULAR; INTRAVENOUS ONCE
Status: COMPLETED | OUTPATIENT
Start: 2023-08-23 | End: 2023-08-23

## 2023-08-23 RX ORDER — VERAPAMIL HYDROCHLORIDE 40 MG/1
40 TABLET ORAL EVERY 8 HOURS SCHEDULED
Qty: 90 TABLET | Refills: 3 | Status: SHIPPED | OUTPATIENT
Start: 2023-08-23

## 2023-08-23 RX ORDER — DIAZEPAM 2 MG/1
5 TABLET ORAL ONCE
Status: COMPLETED | OUTPATIENT
Start: 2023-08-23 | End: 2023-08-23

## 2023-08-23 RX ORDER — KETOROLAC TROMETHAMINE 30 MG/ML
15 INJECTION, SOLUTION INTRAMUSCULAR; INTRAVENOUS ONCE
Status: COMPLETED | OUTPATIENT
Start: 2023-08-23 | End: 2023-08-23

## 2023-08-23 RX ORDER — PROCHLORPERAZINE EDISYLATE 5 MG/ML
5 INJECTION INTRAMUSCULAR; INTRAVENOUS ONCE
Status: COMPLETED | OUTPATIENT
Start: 2023-08-23 | End: 2023-08-23

## 2023-08-23 RX ADMIN — ENOXAPARIN SODIUM 30 MG: 100 INJECTION SUBCUTANEOUS at 09:09

## 2023-08-23 RX ADMIN — DIAZEPAM 5 MG: 2 TABLET ORAL at 09:09

## 2023-08-23 RX ADMIN — VERAPAMIL HYDROCHLORIDE 40 MG: 80 TABLET ORAL at 05:20

## 2023-08-23 RX ADMIN — KETOROLAC TROMETHAMINE 15 MG: 30 INJECTION, SOLUTION INTRAMUSCULAR at 09:36

## 2023-08-23 RX ADMIN — ASPIRIN 81 MG 81 MG: 81 TABLET ORAL at 09:09

## 2023-08-23 RX ADMIN — PROCHLORPERAZINE EDISYLATE 5 MG: 5 INJECTION INTRAMUSCULAR; INTRAVENOUS at 09:41

## 2023-08-23 RX ADMIN — DEXAMETHASONE SODIUM PHOSPHATE 10 MG: 10 INJECTION INTRAMUSCULAR; INTRAVENOUS at 09:39

## 2023-08-23 ASSESSMENT — PAIN SCALES - GENERAL
PAINLEVEL_OUTOF10: 0
PAINLEVEL_OUTOF10: 3
PAINLEVEL_OUTOF10: 6

## 2023-08-23 ASSESSMENT — PAIN DESCRIPTION - LOCATION
LOCATION: ABDOMEN

## 2023-08-23 ASSESSMENT — PAIN DESCRIPTION - ORIENTATION
ORIENTATION: LOWER

## 2023-08-23 ASSESSMENT — PAIN DESCRIPTION - DESCRIPTORS
DESCRIPTORS: THROBBING

## 2023-08-23 NOTE — DISCHARGE SUMMARY
--    CO2 30  --    ANIONGAP 3  --    BUN 8  --    CREATININE 0.80 0.62*   LABGLOM >60  --    CALCIUM 9.7  --    GLUCOSE 132*  --       CBC @LABRCNT(WBC:3,RBC:3,HGB:3,HCT:3,MCV:3,MCH:3,MCHC:3,RDW:3,PLT:3,MPV:3,NRBC:3,SEGS:3,LYMPHOPCT:3,EOSRELPCT:3,MONOPCT:3,BASOPCT:3,IMMGRAN:3,SEGSABS:3,LYMPHSABS:3,EOSABS:3,MONOSABS:3,BASOSABS:3,ABSIMMGRAN:3)@   LFT Recent Labs     08/21/23  0920   BILITOT 0.4   ALKPHOS 126   AST 38*   ALT 59   PROT 8.4*   LABALBU 3.9   GLOB 4.5      Cardiac  Lab Results   Component Value Date/Time    TROPHS 5.6 08/21/2023 09:20 AM    TROPHS 4.8 07/03/2022 05:48 AM      Coags Lab Results   Component Value Date/Time    PROTIME 13.3 08/21/2023 09:20 AM    PROTIME 13.2 07/03/2022 05:48 AM    INR 1.0 08/21/2023 09:20 AM    INR 1.0 07/03/2022 05:48 AM      A1c Lab Results   Component Value Date/Time    LABA1C 6.3 08/22/2023 04:14 AM     08/22/2023 04:14 AM      Lipids Lab Results   Component Value Date/Time    CHOL 152 08/22/2023 04:14 AM    LDLCALC 62.6 08/22/2023 04:14 AM    LABVLDL 44.4 08/22/2023 04:14 AM    HDL 45 08/22/2023 04:14 AM    CHOLHDLRATIO 3.4 08/22/2023 04:14 AM    TRIG 222 08/22/2023 04:14 AM      Thyroid  No results found for: Teto Doarn     Most Recent UA No results found for: Stanley Malcolm, [address], LABPH, PROTEINU, GLUCOSEU, [address] [facility], [address] [facility], BLOODU, UROBILINOGEN, NITRU, LEUKOCYTESUR, [address], RBCUA, [address] [facility], BACTERIA, LABCAST, MUCUS     Microbiology:  Results       Procedure Component Value Units Date/Time    COVID-19, Rapid [[id number]] Collected: 08/21/23 0920    Order Status: Completed Specimen: Nasopharyngeal Updated: 08/21/23 0944     Source NASAL        SARS-CoV-2, Rapid Not detected        Comment:    The specimen is NEGATIVE for SARS-CoV-2, the novel coronavirus associated with COVID-19. A negative result does not rule out COVID-19. This test has been authorized by the FDA under an Emergency Use Authorization (EUA) for use by authorized laboratories. written by using a voice dictation software. The note has been proof read but may still contain some grammatical/other typographical errors.

## 2025-01-27 NOTE — ED PROVIDER NOTES
Addended by: TRICIA BENTON on: 1/27/2025 09:38 AM     Modules accepted: Orders     HPI Comments: 49-year-old female who presents with complaints of elevated blood pressure, generalized headache and neck pain. Patient has a history of hypertension she has been on lisinopril 10 mg for approximately 4 years  Reports that her last PMD visit she had some elevated blood pressures which  Did improve while she was still in her office and they did not change her medications at that time. The patient followed at [facility]. The patient denies any chest pain or shortness of breath. She's had no pedal edema. No change in urinary output. Patient is a 50 y.o. female presenting with hypertension. The history is provided by the patient. Hypertension    This is a recurrent problem. The current episode started 2 days ago. The problem has not changed since onset. Associated symptoms include anxiety, malaise/fatigue, headaches and neck pain. Pertinent negatives include no chest pain, no palpitations, no peripheral edema, no dizziness, no shortness of breath, no nausea and no vomiting. There are no associated agents to hypertension. Risk factors include hypertension. Past Medical History:   Diagnosis Date    bipolar     bipolar    Hypertension        Past Surgical History:   Procedure Laterality Date    HX GYN      BTL         No family history on file. Social History     Social History    Marital status: SINGLE     Spouse name: N/A    Number of children: N/A    Years of education: N/A     Occupational History    Not on file. Social History Main Topics    Smoking status: Current Every Day Smoker     Packs/day: 0.75    Smokeless tobacco: Not on file    Alcohol use 1.5 oz/week     3 Cans of beer per week    Drug use: No    Sexual activity: Not on file     Other Topics Concern    Not on file     Social History Narrative         ALLERGIES: Pcn [penicillins]    Review of Systems   Constitutional: Positive for malaise/fatigue. Negative for chills and fever.    HENT: Negative for congestion, ear pain and rhinorrhea. Eyes: Negative for photophobia and discharge. Respiratory: Negative for cough and shortness of breath. Cardiovascular: Negative for chest pain and palpitations. Gastrointestinal: Negative for abdominal pain, constipation, diarrhea, nausea and vomiting. Endocrine: Negative for cold intolerance and heat intolerance. Genitourinary: Negative for dysuria and flank pain. Musculoskeletal: Positive for neck pain. Negative for arthralgias and myalgias. Skin: Negative for rash and wound. Allergic/Immunologic: Negative for environmental allergies and food allergies. Neurological: Positive for headaches. Negative for dizziness and syncope. Hematological: Negative for adenopathy. Does not bruise/bleed easily. Psychiatric/Behavioral: Negative for dysphoric mood and sleep disturbance. The patient is not nervous/anxious. All other systems reviewed and are negative. Vitals:    01/13/18 2318   BP: (!) 209/108   Pulse: 76   Resp: 16   Temp: 98 °F (36.7 °C)   SpO2: 97%   Weight: 99.8 kg (220 lb)   Height: 5' 9\" (1.753 m)            Physical Exam   Constitutional: She is oriented to person, place, and time. She appears well-developed and well-nourished. She appears distressed. HENT:   Head: Normocephalic and atraumatic. Mouth/Throat: No oropharyngeal exudate. Eyes: Conjunctivae and EOM are normal. Pupils are equal, round, and reactive to light. Right eye exhibits no discharge. Left eye exhibits no discharge. No scleral icterus. Neck: Normal range of motion. Neck supple. No thyromegaly present. Cardiovascular: Normal rate, regular rhythm, normal heart sounds and intact distal pulses. Exam reveals no gallop and no friction rub. No murmur heard. Pulmonary/Chest: Effort normal and breath sounds normal. No respiratory distress. She has no wheezes. She exhibits no tenderness. Abdominal: Soft. Bowel sounds are normal. She exhibits no distension.  There is no hepatosplenomegaly. There is no tenderness. There is no rebound and no guarding. No hernia. Musculoskeletal: Normal range of motion. She exhibits no edema or tenderness. Neurological: She is alert and oriented to person, place, and time. No cranial nerve deficit. She exhibits normal muscle tone. Skin: Skin is warm and dry. No rash noted. She is not diaphoretic. No erythema. Psychiatric: She has a normal mood and affect. Her behavior is normal. Judgment and thought content normal.   Nursing note and vitals reviewed. MDM  Number of Diagnoses or Management Options  Hypertension, unspecified type: established and worsening  Urinary tract infection without hematuria, site unspecified: new and requires workup  Diagnosis management comments: differential diagnosis: hypertension, anxiety, renal dysfunction, electrolyte abnormality    EKG reviewed  Sinus rhythm   normal intervals and axis   no ectopy   no acute ischemic changes      Blood pressure improved with medications. UTI found with lab testing. We'll switch the patient from lisinopril 10 to combination lisinopril/HCTZ 20-12.5  Close follow-up with her primary care doctor is strongly advised       Amount and/or Complexity of Data Reviewed  Clinical lab tests: ordered and reviewed  Tests in the medicine section of CPT®: ordered and reviewed  Review and summarize past medical records: yes  Independent visualization of images, tracings, or specimens: yes    Risk of Complications, Morbidity, and/or Mortality  Presenting problems: moderate  Diagnostic procedures: moderate  Management options: moderate  General comments: Elements of this note have been dictated via voice recognition software. Text and phrases may be limited by the accuracy of the software. The chart has been reviewed, but errors may still be present.       Patient Progress  Patient progress: improved    ED Course       Procedures

## 2025-09-06 ENCOUNTER — APPOINTMENT (OUTPATIENT)
Dept: GENERAL RADIOLOGY | Age: 55
End: 2025-09-06
Payer: MEDICARE

## 2025-09-06 ENCOUNTER — HOSPITAL ENCOUNTER (EMERGENCY)
Age: 55
Discharge: HOME | End: 2025-09-06
Attending: EMERGENCY MEDICINE
Payer: MEDICARE

## 2025-09-06 VITALS
BODY MASS INDEX: 33.21 KG/M2 | TEMPERATURE: 98 F | OXYGEN SATURATION: 95 % | DIASTOLIC BLOOD PRESSURE: 83 MMHG | HEIGHT: 70 IN | SYSTOLIC BLOOD PRESSURE: 128 MMHG | HEART RATE: 74 BPM | RESPIRATION RATE: 16 BRPM | WEIGHT: 232 LBS

## 2025-09-06 DIAGNOSIS — S89.91XA INJURY OF RIGHT KNEE, INITIAL ENCOUNTER: Primary | ICD-10-CM

## 2025-09-06 PROCEDURE — 73562 X-RAY EXAM OF KNEE 3: CPT

## 2025-09-06 PROCEDURE — 73552 X-RAY EXAM OF FEMUR 2/>: CPT

## 2025-09-06 PROCEDURE — 6370000000 HC RX 637 (ALT 250 FOR IP): Performed by: EMERGENCY MEDICINE

## 2025-09-06 RX ORDER — DIAZEPAM 5 MG/1
5 TABLET ORAL ONCE
Status: COMPLETED | OUTPATIENT
Start: 2025-09-06 | End: 2025-09-06

## 2025-09-06 RX ORDER — TRAMADOL HYDROCHLORIDE 50 MG/1
50 TABLET ORAL EVERY 6 HOURS PRN
Qty: 12 TABLET | Refills: 0 | Status: SHIPPED | OUTPATIENT
Start: 2025-09-06 | End: 2025-09-09

## 2025-09-06 RX ORDER — HYDROCODONE BITARTRATE AND ACETAMINOPHEN 7.5; 325 MG/1; MG/1
1 TABLET ORAL
Refills: 0 | Status: COMPLETED | OUTPATIENT
Start: 2025-09-06 | End: 2025-09-06

## 2025-09-06 RX ADMIN — DIAZEPAM 5 MG: 5 TABLET ORAL at 16:41

## 2025-09-06 RX ADMIN — HYDROCODONE BITARTRATE AND ACETAMINOPHEN 1 TABLET: 7.5; 325 TABLET ORAL at 16:41

## 2025-09-06 ASSESSMENT — LIFESTYLE VARIABLES
HOW OFTEN DO YOU HAVE A DRINK CONTAINING ALCOHOL: 4 OR MORE TIMES A WEEK
HOW MANY STANDARD DRINKS CONTAINING ALCOHOL DO YOU HAVE ON A TYPICAL DAY: 1 OR 2

## 2025-09-06 ASSESSMENT — PAIN DESCRIPTION - ORIENTATION
ORIENTATION: RIGHT

## 2025-09-06 ASSESSMENT — PAIN - FUNCTIONAL ASSESSMENT
PAIN_FUNCTIONAL_ASSESSMENT: 0-10

## 2025-09-06 ASSESSMENT — PAIN DESCRIPTION - LOCATION
LOCATION: KNEE

## 2025-09-06 ASSESSMENT — ENCOUNTER SYMPTOMS
PHOTOPHOBIA: 0
BACK PAIN: 0
COLOR CHANGE: 0
SHORTNESS OF BREATH: 0
EYE REDNESS: 0
ABDOMINAL PAIN: 0

## 2025-09-06 ASSESSMENT — PAIN DESCRIPTION - DESCRIPTORS
DESCRIPTORS: ACHING

## 2025-09-06 ASSESSMENT — PAIN SCALES - GENERAL
PAINLEVEL_OUTOF10: 10
PAINLEVEL_OUTOF10: 8
PAINLEVEL_OUTOF10: 10